# Patient Record
Sex: MALE | Race: WHITE | NOT HISPANIC OR LATINO | Employment: OTHER | URBAN - METROPOLITAN AREA
[De-identification: names, ages, dates, MRNs, and addresses within clinical notes are randomized per-mention and may not be internally consistent; named-entity substitution may affect disease eponyms.]

---

## 2019-08-25 ENCOUNTER — APPOINTMENT (OUTPATIENT)
Dept: CT IMAGING | Facility: HOSPITAL | Age: 79
End: 2019-08-25

## 2019-08-25 ENCOUNTER — HOSPITAL ENCOUNTER (OUTPATIENT)
Facility: HOSPITAL | Age: 79
Setting detail: OBSERVATION
Discharge: NURSING, REHAB OR LONG-TERM CARE | End: 2019-08-29
Attending: EMERGENCY MEDICINE | Admitting: INTERNAL MEDICINE

## 2019-08-25 ENCOUNTER — APPOINTMENT (OUTPATIENT)
Dept: GENERAL RADIOLOGY | Facility: HOSPITAL | Age: 79
End: 2019-08-25

## 2019-08-25 DIAGNOSIS — R42 ORTHOSTATIC DIZZINESS: ICD-10-CM

## 2019-08-25 DIAGNOSIS — R53.1 GENERALIZED WEAKNESS: Primary | ICD-10-CM

## 2019-08-25 DIAGNOSIS — R55 NEAR SYNCOPE: ICD-10-CM

## 2019-08-25 PROBLEM — G40.909 SEIZURE DISORDER (HCC): Status: ACTIVE | Noted: 2019-08-25

## 2019-08-25 PROBLEM — I25.10 CAD (CORONARY ARTERY DISEASE): Status: ACTIVE | Noted: 2019-08-25

## 2019-08-25 PROBLEM — J44.9 COPD (CHRONIC OBSTRUCTIVE PULMONARY DISEASE) (HCC): Status: ACTIVE | Noted: 2019-08-25

## 2019-08-25 PROBLEM — Z72.0 TOBACCO USE: Status: ACTIVE | Noted: 2019-08-25

## 2019-08-25 PROBLEM — I95.1 ORTHOSTATIC HYPOTENSION: Status: ACTIVE | Noted: 2019-08-25

## 2019-08-25 PROBLEM — F32.A DEPRESSION: Status: ACTIVE | Noted: 2019-08-25

## 2019-08-25 LAB
ALBUMIN SERPL-MCNC: 4.5 G/DL (ref 3.5–5.2)
ALBUMIN/GLOB SERPL: 2.8 G/DL
ALP SERPL-CCNC: 45 U/L (ref 39–117)
ALT SERPL W P-5'-P-CCNC: 13 U/L (ref 1–41)
ANION GAP SERPL CALCULATED.3IONS-SCNC: 10.4 MMOL/L (ref 5–15)
AST SERPL-CCNC: 23 U/L (ref 1–40)
BACTERIA UR QL AUTO: ABNORMAL /HPF
BILIRUB SERPL-MCNC: 1 MG/DL (ref 0.2–1.2)
BILIRUB UR QL STRIP: NEGATIVE
BUN BLD-MCNC: 18 MG/DL (ref 8–23)
BUN/CREAT SERPL: 20 (ref 7–25)
CALCIUM SPEC-SCNC: 9.9 MG/DL (ref 8.6–10.5)
CHLORIDE SERPL-SCNC: 104 MMOL/L (ref 98–107)
CLARITY UR: ABNORMAL
CO2 SERPL-SCNC: 22.6 MMOL/L (ref 22–29)
COD CRY URNS QL: ABNORMAL /HPF
COLOR UR: ABNORMAL
CREAT BLD-MCNC: 0.9 MG/DL (ref 0.76–1.27)
DEPRECATED RDW RBC AUTO: 77.1 FL (ref 37–54)
EOSINOPHIL # BLD MANUAL: 0.07 10*3/MM3 (ref 0–0.4)
EOSINOPHIL NFR BLD MANUAL: 1 % (ref 0.3–6.2)
ERYTHROCYTE [DISTWIDTH] IN BLOOD BY AUTOMATED COUNT: 18.5 % (ref 12.3–15.4)
GFR SERPL CREATININE-BSD FRML MDRD: 81 ML/MIN/1.73
GFR SERPL CREATININE-BSD FRML MDRD: 98 ML/MIN/1.73
GLOBULIN UR ELPH-MCNC: 1.6 GM/DL
GLUCOSE BLD-MCNC: 108 MG/DL (ref 65–99)
GLUCOSE UR STRIP-MCNC: NEGATIVE MG/DL
HCT VFR BLD AUTO: 35.2 % (ref 37.5–51)
HGB BLD-MCNC: 11.5 G/DL (ref 13–17.7)
HGB UR QL STRIP.AUTO: NEGATIVE
HOLD SPECIMEN: NORMAL
HOLD SPECIMEN: NORMAL
HYALINE CASTS UR QL AUTO: ABNORMAL /LPF
KETONES UR QL STRIP: ABNORMAL
LEUKOCYTE ESTERASE UR QL STRIP.AUTO: ABNORMAL
LYMPHOCYTES # BLD MANUAL: 2.36 10*3/MM3 (ref 0.7–3.1)
LYMPHOCYTES NFR BLD MANUAL: 36 % (ref 19.6–45.3)
LYMPHOCYTES NFR BLD MANUAL: 9 % (ref 5–12)
MCH RBC QN AUTO: 36.9 PG (ref 26.6–33)
MCHC RBC AUTO-ENTMCNC: 32.7 G/DL (ref 31.5–35.7)
MCV RBC AUTO: 112.8 FL (ref 79–97)
MONOCYTES # BLD AUTO: 0.59 10*3/MM3 (ref 0.1–0.9)
NEUTROPHILS # BLD AUTO: 3.61 10*3/MM3 (ref 1.7–7)
NEUTROPHILS NFR BLD MANUAL: 55 % (ref 42.7–76)
NITRITE UR QL STRIP: NEGATIVE
PH UR STRIP.AUTO: 5.5 [PH] (ref 5–8)
PLAT MORPH BLD: NORMAL
PLATELET # BLD AUTO: 304 10*3/MM3 (ref 140–450)
PMV BLD AUTO: 11.9 FL (ref 6–12)
POTASSIUM BLD-SCNC: 4.3 MMOL/L (ref 3.5–5.2)
PROCALCITONIN SERPL-MCNC: 0.03 NG/ML (ref 0.1–0.25)
PROT SERPL-MCNC: 6.1 G/DL (ref 6–8.5)
PROT UR QL STRIP: ABNORMAL
RBC # BLD AUTO: 3.12 10*6/MM3 (ref 4.14–5.8)
RBC # UR: ABNORMAL /HPF
RBC MORPH BLD: NORMAL
REF LAB TEST METHOD: ABNORMAL
SODIUM BLD-SCNC: 137 MMOL/L (ref 136–145)
SP GR UR STRIP: 1.02 (ref 1–1.03)
SQUAMOUS #/AREA URNS HPF: ABNORMAL /HPF
TROPONIN T SERPL-MCNC: <0.01 NG/ML (ref 0–0.03)
TROPONIN T SERPL-MCNC: <0.01 NG/ML (ref 0–0.03)
UROBILINOGEN UR QL STRIP: ABNORMAL
WBC MORPH BLD: NORMAL
WBC NRBC COR # BLD: 6.56 10*3/MM3 (ref 3.4–10.8)
WBC UR QL AUTO: ABNORMAL /HPF
WHOLE BLOOD HOLD SPECIMEN: NORMAL
WHOLE BLOOD HOLD SPECIMEN: NORMAL

## 2019-08-25 PROCEDURE — 70450 CT HEAD/BRAIN W/O DYE: CPT

## 2019-08-25 PROCEDURE — 93005 ELECTROCARDIOGRAM TRACING: CPT

## 2019-08-25 PROCEDURE — 85007 BL SMEAR W/DIFF WBC COUNT: CPT

## 2019-08-25 PROCEDURE — 93010 ELECTROCARDIOGRAM REPORT: CPT | Performed by: INTERNAL MEDICINE

## 2019-08-25 PROCEDURE — 99285 EMERGENCY DEPT VISIT HI MDM: CPT

## 2019-08-25 PROCEDURE — 84484 ASSAY OF TROPONIN QUANT: CPT

## 2019-08-25 PROCEDURE — G0378 HOSPITAL OBSERVATION PER HR: HCPCS

## 2019-08-25 PROCEDURE — 93005 ELECTROCARDIOGRAM TRACING: CPT | Performed by: EMERGENCY MEDICINE

## 2019-08-25 PROCEDURE — 84145 PROCALCITONIN (PCT): CPT | Performed by: EMERGENCY MEDICINE

## 2019-08-25 PROCEDURE — 85025 COMPLETE CBC W/AUTO DIFF WBC: CPT

## 2019-08-25 PROCEDURE — 71045 X-RAY EXAM CHEST 1 VIEW: CPT

## 2019-08-25 PROCEDURE — 80053 COMPREHEN METABOLIC PANEL: CPT

## 2019-08-25 PROCEDURE — 81001 URINALYSIS AUTO W/SCOPE: CPT | Performed by: NURSE PRACTITIONER

## 2019-08-25 PROCEDURE — 84484 ASSAY OF TROPONIN QUANT: CPT | Performed by: EMERGENCY MEDICINE

## 2019-08-25 PROCEDURE — 96360 HYDRATION IV INFUSION INIT: CPT

## 2019-08-25 PROCEDURE — 71250 CT THORAX DX C-: CPT

## 2019-08-25 RX ORDER — MELATONIN
1000 DAILY
COMMUNITY

## 2019-08-25 RX ORDER — DOCUSATE SODIUM 100 MG/1
100 CAPSULE, LIQUID FILLED ORAL 2 TIMES DAILY
COMMUNITY

## 2019-08-25 RX ORDER — MIDODRINE HYDROCHLORIDE 5 MG/1
5 TABLET ORAL 3 TIMES DAILY
Status: ON HOLD | COMMUNITY
End: 2019-08-28 | Stop reason: SDUPTHER

## 2019-08-25 RX ORDER — BUSPIRONE HYDROCHLORIDE 10 MG/1
10 TABLET ORAL 3 TIMES DAILY
COMMUNITY

## 2019-08-25 RX ORDER — ASPIRIN 81 MG/1
324 TABLET, CHEWABLE ORAL ONCE
Status: COMPLETED | OUTPATIENT
Start: 2019-08-25 | End: 2019-08-25

## 2019-08-25 RX ORDER — GABAPENTIN 300 MG/1
300 CAPSULE ORAL 3 TIMES DAILY
Status: ON HOLD | COMMUNITY
End: 2019-08-28 | Stop reason: SDUPTHER

## 2019-08-25 RX ORDER — ATORVASTATIN CALCIUM 80 MG/1
80 TABLET, FILM COATED ORAL DAILY
COMMUNITY

## 2019-08-25 RX ORDER — ASPIRIN 81 MG/1
81 TABLET, CHEWABLE ORAL DAILY
COMMUNITY

## 2019-08-25 RX ORDER — ALPRAZOLAM 0.5 MG/1
0.5 TABLET ORAL 2 TIMES DAILY PRN
Status: ON HOLD | COMMUNITY
End: 2019-08-28 | Stop reason: SDUPTHER

## 2019-08-25 RX ORDER — ISOSORBIDE MONONITRATE 30 MG/1
30 TABLET, EXTENDED RELEASE ORAL DAILY
COMMUNITY
End: 2019-08-29 | Stop reason: HOSPADM

## 2019-08-25 RX ORDER — CLOPIDOGREL BISULFATE 75 MG/1
75 TABLET ORAL DAILY
COMMUNITY

## 2019-08-25 RX ORDER — LEVETIRACETAM 500 MG/1
500 TABLET ORAL 2 TIMES DAILY
COMMUNITY

## 2019-08-25 RX ORDER — QUETIAPINE FUMARATE 50 MG/1
50 TABLET, FILM COATED ORAL NIGHTLY
COMMUNITY

## 2019-08-25 RX ORDER — SODIUM CHLORIDE 0.9 % (FLUSH) 0.9 %
10 SYRINGE (ML) INJECTION AS NEEDED
Status: DISCONTINUED | OUTPATIENT
Start: 2019-08-25 | End: 2019-08-29 | Stop reason: HOSPADM

## 2019-08-25 RX ORDER — PANTOPRAZOLE SODIUM 40 MG/1
40 TABLET, DELAYED RELEASE ORAL DAILY
COMMUNITY

## 2019-08-25 RX ORDER — SODIUM CHLORIDE 9 MG/ML
125 INJECTION, SOLUTION INTRAVENOUS CONTINUOUS
Status: DISCONTINUED | OUTPATIENT
Start: 2019-08-25 | End: 2019-08-27

## 2019-08-25 RX ORDER — SERTRALINE HYDROCHLORIDE 100 MG/1
100 TABLET, FILM COATED ORAL DAILY
COMMUNITY

## 2019-08-25 RX ORDER — IPRATROPIUM BROMIDE AND ALBUTEROL SULFATE 2.5; .5 MG/3ML; MG/3ML
3 SOLUTION RESPIRATORY (INHALATION) EVERY 4 HOURS PRN
COMMUNITY

## 2019-08-25 RX ORDER — RANOLAZINE 500 MG/1
500 TABLET, EXTENDED RELEASE ORAL 2 TIMES DAILY
COMMUNITY

## 2019-08-25 RX ADMIN — ASPIRIN 324 MG: 81 TABLET, CHEWABLE ORAL at 23:38

## 2019-08-25 RX ADMIN — SODIUM CHLORIDE 500 ML: 9 INJECTION, SOLUTION INTRAVENOUS at 23:17

## 2019-08-25 RX ADMIN — SODIUM CHLORIDE 1000 ML: 9 INJECTION, SOLUTION INTRAVENOUS at 17:05

## 2019-08-26 PROBLEM — I95.1 ORTHOSTATIC SYNCOPE: Status: ACTIVE | Noted: 2019-08-26

## 2019-08-26 LAB
ANION GAP SERPL CALCULATED.3IONS-SCNC: 8.9 MMOL/L (ref 5–15)
B PARAPERT DNA SPEC QL NAA+PROBE: NOT DETECTED
B PERT DNA SPEC QL NAA+PROBE: NOT DETECTED
BUN BLD-MCNC: 22 MG/DL (ref 8–23)
BUN/CREAT SERPL: 28.6 (ref 7–25)
C PNEUM DNA NPH QL NAA+NON-PROBE: NOT DETECTED
CALCIUM SPEC-SCNC: 8.4 MG/DL (ref 8.6–10.5)
CHLORIDE SERPL-SCNC: 109 MMOL/L (ref 98–107)
CO2 SERPL-SCNC: 22.1 MMOL/L (ref 22–29)
CORTIS SERPL-MCNC: 6.09 MCG/DL
CREAT BLD-MCNC: 0.77 MG/DL (ref 0.76–1.27)
DEPRECATED RDW RBC AUTO: 72.4 FL (ref 37–54)
ERYTHROCYTE [DISTWIDTH] IN BLOOD BY AUTOMATED COUNT: 17.6 % (ref 12.3–15.4)
FLUAV H1 2009 PAND RNA NPH QL NAA+PROBE: NOT DETECTED
FLUAV H1 HA GENE NPH QL NAA+PROBE: NOT DETECTED
FLUAV H3 RNA NPH QL NAA+PROBE: NOT DETECTED
FLUAV SUBTYP SPEC NAA+PROBE: NOT DETECTED
FLUBV RNA ISLT QL NAA+PROBE: NOT DETECTED
GFR SERPL CREATININE-BSD FRML MDRD: 97 ML/MIN/1.73
GLUCOSE BLD-MCNC: 82 MG/DL (ref 65–99)
HADV DNA SPEC NAA+PROBE: NOT DETECTED
HCOV 229E RNA SPEC QL NAA+PROBE: NOT DETECTED
HCOV HKU1 RNA SPEC QL NAA+PROBE: NOT DETECTED
HCOV NL63 RNA SPEC QL NAA+PROBE: NOT DETECTED
HCOV OC43 RNA SPEC QL NAA+PROBE: NOT DETECTED
HCT VFR BLD AUTO: 29.3 % (ref 37.5–51)
HGB BLD-MCNC: 9.6 G/DL (ref 13–17.7)
HMPV RNA NPH QL NAA+NON-PROBE: NOT DETECTED
HPIV1 RNA SPEC QL NAA+PROBE: NOT DETECTED
HPIV2 RNA SPEC QL NAA+PROBE: NOT DETECTED
HPIV3 RNA NPH QL NAA+PROBE: NOT DETECTED
HPIV4 P GENE NPH QL NAA+PROBE: NOT DETECTED
M PNEUMO IGG SER IA-ACNC: NOT DETECTED
MCH RBC QN AUTO: 36.4 PG (ref 26.6–33)
MCHC RBC AUTO-ENTMCNC: 32.8 G/DL (ref 31.5–35.7)
MCV RBC AUTO: 111 FL (ref 79–97)
PLATELET # BLD AUTO: 265 10*3/MM3 (ref 140–450)
PMV BLD AUTO: 11.3 FL (ref 6–12)
POTASSIUM BLD-SCNC: 3.6 MMOL/L (ref 3.5–5.2)
RBC # BLD AUTO: 2.64 10*6/MM3 (ref 4.14–5.8)
RHINOVIRUS RNA SPEC NAA+PROBE: NOT DETECTED
RSV RNA NPH QL NAA+NON-PROBE: NOT DETECTED
SODIUM BLD-SCNC: 140 MMOL/L (ref 136–145)
TROPONIN T SERPL-MCNC: <0.01 NG/ML (ref 0–0.03)
TROPONIN T SERPL-MCNC: <0.01 NG/ML (ref 0–0.03)
WBC NRBC COR # BLD: 5.34 10*3/MM3 (ref 3.4–10.8)

## 2019-08-26 PROCEDURE — 80048 BASIC METABOLIC PNL TOTAL CA: CPT | Performed by: NURSE PRACTITIONER

## 2019-08-26 PROCEDURE — 97535 SELF CARE MNGMENT TRAINING: CPT

## 2019-08-26 PROCEDURE — 96361 HYDRATE IV INFUSION ADD-ON: CPT

## 2019-08-26 PROCEDURE — 36415 COLL VENOUS BLD VENIPUNCTURE: CPT | Performed by: NURSE PRACTITIONER

## 2019-08-26 PROCEDURE — 0100U HC BIOFIRE FILMARRAY RESP PANEL 2: CPT | Performed by: NURSE PRACTITIONER

## 2019-08-26 PROCEDURE — 97161 PT EVAL LOW COMPLEX 20 MIN: CPT

## 2019-08-26 PROCEDURE — G0378 HOSPITAL OBSERVATION PER HR: HCPCS

## 2019-08-26 PROCEDURE — 97165 OT EVAL LOW COMPLEX 30 MIN: CPT

## 2019-08-26 PROCEDURE — 85027 COMPLETE CBC AUTOMATED: CPT | Performed by: NURSE PRACTITIONER

## 2019-08-26 PROCEDURE — 84484 ASSAY OF TROPONIN QUANT: CPT | Performed by: NURSE PRACTITIONER

## 2019-08-26 PROCEDURE — 82533 TOTAL CORTISOL: CPT | Performed by: NURSE PRACTITIONER

## 2019-08-26 PROCEDURE — 97110 THERAPEUTIC EXERCISES: CPT

## 2019-08-26 RX ORDER — ACETAMINOPHEN 325 MG/1
650 TABLET ORAL EVERY 4 HOURS PRN
Status: DISCONTINUED | OUTPATIENT
Start: 2019-08-26 | End: 2019-08-29 | Stop reason: HOSPADM

## 2019-08-26 RX ORDER — SERTRALINE HYDROCHLORIDE 100 MG/1
100 TABLET, FILM COATED ORAL DAILY
Status: DISCONTINUED | OUTPATIENT
Start: 2019-08-26 | End: 2019-08-29 | Stop reason: HOSPADM

## 2019-08-26 RX ORDER — RANOLAZINE 500 MG/1
500 TABLET, EXTENDED RELEASE ORAL EVERY 12 HOURS SCHEDULED
Status: DISCONTINUED | OUTPATIENT
Start: 2019-08-26 | End: 2019-08-29 | Stop reason: HOSPADM

## 2019-08-26 RX ORDER — DOXYCYCLINE HYCLATE 100 MG/1
100 CAPSULE ORAL 2 TIMES DAILY
COMMUNITY

## 2019-08-26 RX ORDER — LEVETIRACETAM 500 MG/1
500 TABLET ORAL EVERY 12 HOURS SCHEDULED
Status: DISCONTINUED | OUTPATIENT
Start: 2019-08-26 | End: 2019-08-29 | Stop reason: HOSPADM

## 2019-08-26 RX ORDER — PANTOPRAZOLE SODIUM 40 MG/1
40 TABLET, DELAYED RELEASE ORAL DAILY
Status: DISCONTINUED | OUTPATIENT
Start: 2019-08-26 | End: 2019-08-29 | Stop reason: HOSPADM

## 2019-08-26 RX ORDER — ONDANSETRON 2 MG/ML
4 INJECTION INTRAMUSCULAR; INTRAVENOUS EVERY 6 HOURS PRN
Status: DISCONTINUED | OUTPATIENT
Start: 2019-08-26 | End: 2019-08-29 | Stop reason: HOSPADM

## 2019-08-26 RX ORDER — QUETIAPINE FUMARATE 50 MG/1
50 TABLET, FILM COATED ORAL NIGHTLY
Status: DISCONTINUED | OUTPATIENT
Start: 2019-08-26 | End: 2019-08-29 | Stop reason: HOSPADM

## 2019-08-26 RX ORDER — ACETAMINOPHEN 160 MG/5ML
650 SOLUTION ORAL EVERY 4 HOURS PRN
Status: DISCONTINUED | OUTPATIENT
Start: 2019-08-26 | End: 2019-08-29 | Stop reason: HOSPADM

## 2019-08-26 RX ORDER — CALCIUM CARBONATE 200(500)MG
2 TABLET,CHEWABLE ORAL 3 TIMES DAILY PRN
Status: DISCONTINUED | OUTPATIENT
Start: 2019-08-26 | End: 2019-08-29 | Stop reason: HOSPADM

## 2019-08-26 RX ORDER — SODIUM CHLORIDE 0.9 % (FLUSH) 0.9 %
10 SYRINGE (ML) INJECTION EVERY 12 HOURS SCHEDULED
Status: DISCONTINUED | OUTPATIENT
Start: 2019-08-26 | End: 2019-08-29 | Stop reason: HOSPADM

## 2019-08-26 RX ORDER — ATORVASTATIN CALCIUM 80 MG/1
80 TABLET, FILM COATED ORAL DAILY
Status: DISCONTINUED | OUTPATIENT
Start: 2019-08-26 | End: 2019-08-26

## 2019-08-26 RX ORDER — IPRATROPIUM BROMIDE AND ALBUTEROL SULFATE 2.5; .5 MG/3ML; MG/3ML
3 SOLUTION RESPIRATORY (INHALATION) EVERY 4 HOURS PRN
Status: DISCONTINUED | OUTPATIENT
Start: 2019-08-26 | End: 2019-08-29 | Stop reason: HOSPADM

## 2019-08-26 RX ORDER — BISACODYL 5 MG/1
5 TABLET, DELAYED RELEASE ORAL DAILY PRN
Status: DISCONTINUED | OUTPATIENT
Start: 2019-08-26 | End: 2019-08-29 | Stop reason: HOSPADM

## 2019-08-26 RX ORDER — ACETAMINOPHEN 650 MG/1
650 SUPPOSITORY RECTAL EVERY 4 HOURS PRN
Status: DISCONTINUED | OUTPATIENT
Start: 2019-08-26 | End: 2019-08-29 | Stop reason: HOSPADM

## 2019-08-26 RX ORDER — CLOPIDOGREL BISULFATE 75 MG/1
75 TABLET ORAL DAILY
Status: DISCONTINUED | OUTPATIENT
Start: 2019-08-26 | End: 2019-08-29 | Stop reason: HOSPADM

## 2019-08-26 RX ORDER — SODIUM CHLORIDE 0.9 % (FLUSH) 0.9 %
3-10 SYRINGE (ML) INJECTION AS NEEDED
Status: DISCONTINUED | OUTPATIENT
Start: 2019-08-25 | End: 2019-08-29 | Stop reason: HOSPADM

## 2019-08-26 RX ORDER — MIDODRINE HYDROCHLORIDE 5 MG/1
5 TABLET ORAL 3 TIMES DAILY
Status: DISCONTINUED | OUTPATIENT
Start: 2019-08-26 | End: 2019-08-26

## 2019-08-26 RX ORDER — MIDODRINE HYDROCHLORIDE 5 MG/1
10 TABLET ORAL 3 TIMES DAILY
Status: DISCONTINUED | OUTPATIENT
Start: 2019-08-26 | End: 2019-08-29 | Stop reason: HOSPADM

## 2019-08-26 RX ORDER — ASPIRIN 81 MG/1
81 TABLET, CHEWABLE ORAL DAILY
Status: DISCONTINUED | OUTPATIENT
Start: 2019-08-26 | End: 2019-08-29 | Stop reason: HOSPADM

## 2019-08-26 RX ORDER — DOCUSATE SODIUM 100 MG/1
100 CAPSULE, LIQUID FILLED ORAL 2 TIMES DAILY
Status: DISCONTINUED | OUTPATIENT
Start: 2019-08-26 | End: 2019-08-29 | Stop reason: HOSPADM

## 2019-08-26 RX ORDER — BUSPIRONE HYDROCHLORIDE 10 MG/1
10 TABLET ORAL 3 TIMES DAILY
Status: DISCONTINUED | OUTPATIENT
Start: 2019-08-26 | End: 2019-08-29 | Stop reason: HOSPADM

## 2019-08-26 RX ORDER — SODIUM CHLORIDE 9 MG/ML
100 INJECTION, SOLUTION INTRAVENOUS CONTINUOUS
Status: DISCONTINUED | OUTPATIENT
Start: 2019-08-26 | End: 2019-08-26

## 2019-08-26 RX ORDER — ATORVASTATIN CALCIUM 80 MG/1
80 TABLET, FILM COATED ORAL NIGHTLY
Status: DISCONTINUED | OUTPATIENT
Start: 2019-08-26 | End: 2019-08-29 | Stop reason: HOSPADM

## 2019-08-26 RX ORDER — SODIUM CHLORIDE 0.9 % (FLUSH) 0.9 %
3 SYRINGE (ML) INJECTION EVERY 12 HOURS SCHEDULED
Status: DISCONTINUED | OUTPATIENT
Start: 2019-08-26 | End: 2019-08-26

## 2019-08-26 RX ADMIN — SODIUM CHLORIDE, PRESERVATIVE FREE 3 ML: 5 INJECTION INTRAVENOUS at 02:23

## 2019-08-26 RX ADMIN — LEVETIRACETAM 500 MG: 500 TABLET, FILM COATED ORAL at 02:58

## 2019-08-26 RX ADMIN — MIDODRINE HYDROCHLORIDE 10 MG: 5 TABLET ORAL at 16:35

## 2019-08-26 RX ADMIN — SODIUM CHLORIDE 125 ML/HR: 9 INJECTION, SOLUTION INTRAVENOUS at 00:22

## 2019-08-26 RX ADMIN — RANOLAZINE 500 MG: 500 TABLET, EXTENDED RELEASE ORAL at 20:45

## 2019-08-26 RX ADMIN — SODIUM CHLORIDE 125 ML/HR: 9 INJECTION, SOLUTION INTRAVENOUS at 02:23

## 2019-08-26 RX ADMIN — SODIUM CHLORIDE 125 ML/HR: 9 INJECTION, SOLUTION INTRAVENOUS at 08:30

## 2019-08-26 RX ADMIN — DOCUSATE SODIUM 100 MG: 100 CAPSULE, LIQUID FILLED ORAL at 08:31

## 2019-08-26 RX ADMIN — QUETIAPINE FUMARATE 50 MG: 50 TABLET, FILM COATED ORAL at 20:45

## 2019-08-26 RX ADMIN — RANOLAZINE 500 MG: 500 TABLET, EXTENDED RELEASE ORAL at 08:31

## 2019-08-26 RX ADMIN — SODIUM CHLORIDE, PRESERVATIVE FREE 10 ML: 5 INJECTION INTRAVENOUS at 08:30

## 2019-08-26 RX ADMIN — MIDODRINE HYDROCHLORIDE 10 MG: 5 TABLET ORAL at 20:46

## 2019-08-26 RX ADMIN — LEVETIRACETAM 500 MG: 500 TABLET, FILM COATED ORAL at 20:45

## 2019-08-26 RX ADMIN — BUSPIRONE HYDROCHLORIDE 10 MG: 10 TABLET ORAL at 16:35

## 2019-08-26 RX ADMIN — CLOPIDOGREL 75 MG: 75 TABLET, FILM COATED ORAL at 08:31

## 2019-08-26 RX ADMIN — PANTOPRAZOLE SODIUM 40 MG: 40 TABLET, DELAYED RELEASE ORAL at 08:30

## 2019-08-26 RX ADMIN — ASPIRIN 81 MG: 81 TABLET, CHEWABLE ORAL at 08:31

## 2019-08-26 RX ADMIN — BUSPIRONE HYDROCHLORIDE 10 MG: 10 TABLET ORAL at 20:46

## 2019-08-26 RX ADMIN — MIDODRINE HYDROCHLORIDE 5 MG: 5 TABLET ORAL at 08:31

## 2019-08-26 RX ADMIN — LEVETIRACETAM 500 MG: 500 TABLET, FILM COATED ORAL at 08:31

## 2019-08-26 RX ADMIN — ATORVASTATIN CALCIUM 80 MG: 80 TABLET, FILM COATED ORAL at 20:45

## 2019-08-26 RX ADMIN — SODIUM CHLORIDE, PRESERVATIVE FREE 10 ML: 5 INJECTION INTRAVENOUS at 20:46

## 2019-08-26 RX ADMIN — SODIUM CHLORIDE 125 ML/HR: 9 INJECTION, SOLUTION INTRAVENOUS at 16:35

## 2019-08-26 RX ADMIN — SERTRALINE 100 MG: 100 TABLET, FILM COATED ORAL at 08:31

## 2019-08-26 RX ADMIN — BUSPIRONE HYDROCHLORIDE 10 MG: 10 TABLET ORAL at 08:31

## 2019-08-27 ENCOUNTER — APPOINTMENT (OUTPATIENT)
Dept: CARDIOLOGY | Facility: HOSPITAL | Age: 79
End: 2019-08-27

## 2019-08-27 LAB
ANION GAP SERPL CALCULATED.3IONS-SCNC: 7.7 MMOL/L (ref 5–15)
AORTIC DIMENSIONLESS INDEX: 1 (DI)
BH CV ECHO MEAS - ACS: 2.2 CM
BH CV ECHO MEAS - AO MAX PG (FULL): 0.38 MMHG
BH CV ECHO MEAS - AO MAX PG: 5.8 MMHG
BH CV ECHO MEAS - AO MEAN PG (FULL): 1 MMHG
BH CV ECHO MEAS - AO MEAN PG: 3 MMHG
BH CV ECHO MEAS - AO ROOT AREA (BSA CORRECTED): 2.2
BH CV ECHO MEAS - AO ROOT AREA: 15.2 CM^2
BH CV ECHO MEAS - AO ROOT DIAM: 4.4 CM
BH CV ECHO MEAS - AO V2 MAX: 120 CM/SEC
BH CV ECHO MEAS - AO V2 MEAN: 77.5 CM/SEC
BH CV ECHO MEAS - AO V2 VTI: 27.1 CM
BH CV ECHO MEAS - AVA(I,A): 4.4 CM^2
BH CV ECHO MEAS - AVA(I,D): 4.4 CM^2
BH CV ECHO MEAS - AVA(V,A): 4.4 CM^2
BH CV ECHO MEAS - AVA(V,D): 4.4 CM^2
BH CV ECHO MEAS - BSA(HAYCOCK): 2 M^2
BH CV ECHO MEAS - BSA: 2 M^2
BH CV ECHO MEAS - BZI_BMI: 23.4 KILOGRAMS/M^2
BH CV ECHO MEAS - BZI_METRIC_HEIGHT: 185.4 CM
BH CV ECHO MEAS - BZI_METRIC_WEIGHT: 80.3 KG
BH CV ECHO MEAS - EDV(CUBED): 50.7 ML
BH CV ECHO MEAS - EDV(MOD-SP2): 125 ML
BH CV ECHO MEAS - EDV(MOD-SP4): 107 ML
BH CV ECHO MEAS - EDV(TEICH): 58.1 ML
BH CV ECHO MEAS - EF(CUBED): 46.7 %
BH CV ECHO MEAS - EF(MOD-BP): 57 %
BH CV ECHO MEAS - EF(MOD-SP2): 54.4 %
BH CV ECHO MEAS - EF(MOD-SP4): 65.4 %
BH CV ECHO MEAS - EF(TEICH): 39.8 %
BH CV ECHO MEAS - ESV(CUBED): 27 ML
BH CV ECHO MEAS - ESV(MOD-SP2): 57 ML
BH CV ECHO MEAS - ESV(MOD-SP4): 37 ML
BH CV ECHO MEAS - ESV(TEICH): 35 ML
BH CV ECHO MEAS - FS: 18.9 %
BH CV ECHO MEAS - IVS/LVPW: 1.4
BH CV ECHO MEAS - IVSD: 1.1 CM
BH CV ECHO MEAS - LAT PEAK E' VEL: 11 CM/SEC
BH CV ECHO MEAS - LV DIASTOLIC VOL/BSA (35-75): 52.4 ML/M^2
BH CV ECHO MEAS - LV MASS(C)D: 104.6 GRAMS
BH CV ECHO MEAS - LV MASS(C)DI: 51.2 GRAMS/M^2
BH CV ECHO MEAS - LV MAX PG: 5.4 MMHG
BH CV ECHO MEAS - LV MEAN PG: 2 MMHG
BH CV ECHO MEAS - LV SYSTOLIC VOL/BSA (12-30): 18.1 ML/M^2
BH CV ECHO MEAS - LV V1 MAX: 116 CM/SEC
BH CV ECHO MEAS - LV V1 MEAN: 68 CM/SEC
BH CV ECHO MEAS - LV V1 VTI: 26.2 CM
BH CV ECHO MEAS - LVIDD: 3.7 CM
BH CV ECHO MEAS - LVIDS: 3 CM
BH CV ECHO MEAS - LVLD AP2: 8.2 CM
BH CV ECHO MEAS - LVLD AP4: 8 CM
BH CV ECHO MEAS - LVLS AP2: 7.4 CM
BH CV ECHO MEAS - LVLS AP4: 6.2 CM
BH CV ECHO MEAS - LVOT AREA (M): 4.5 CM^2
BH CV ECHO MEAS - LVOT AREA: 4.5 CM^2
BH CV ECHO MEAS - LVOT DIAM: 2.4 CM
BH CV ECHO MEAS - LVPWD: 0.8 CM
BH CV ECHO MEAS - MED PEAK E' VEL: 9 CM/SEC
BH CV ECHO MEAS - MR MAX PG: 51 MMHG
BH CV ECHO MEAS - MR MAX VEL: 357 CM/SEC
BH CV ECHO MEAS - MV A DUR: 0.16 SEC
BH CV ECHO MEAS - MV A MAX VEL: 72.3 CM/SEC
BH CV ECHO MEAS - MV DEC SLOPE: 352 CM/SEC^2
BH CV ECHO MEAS - MV DEC TIME: 180 SEC
BH CV ECHO MEAS - MV E MAX VEL: 103 CM/SEC
BH CV ECHO MEAS - MV E/A: 1.4
BH CV ECHO MEAS - MV MAX PG: 3.2 MMHG
BH CV ECHO MEAS - MV MEAN PG: 1 MMHG
BH CV ECHO MEAS - MV P1/2T MAX VEL: 90.7 CM/SEC
BH CV ECHO MEAS - MV P1/2T: 75.5 MSEC
BH CV ECHO MEAS - MV V2 MAX: 89.1 CM/SEC
BH CV ECHO MEAS - MV V2 MEAN: 43.1 CM/SEC
BH CV ECHO MEAS - MV V2 VTI: 32.2 CM
BH CV ECHO MEAS - MVA P1/2T LCG: 2.4 CM^2
BH CV ECHO MEAS - MVA(P1/2T): 2.9 CM^2
BH CV ECHO MEAS - MVA(VTI): 3.7 CM^2
BH CV ECHO MEAS - PA ACC TIME: 0.11 SEC
BH CV ECHO MEAS - PA MAX PG (FULL): 0.98 MMHG
BH CV ECHO MEAS - PA MAX PG: 1.9 MMHG
BH CV ECHO MEAS - PA PR(ACCEL): 31.3 MMHG
BH CV ECHO MEAS - PA V2 MAX: 68.4 CM/SEC
BH CV ECHO MEAS - PULM A REVS DUR: 0.2 SEC
BH CV ECHO MEAS - PULM A REVS VEL: 36.5 CM/SEC
BH CV ECHO MEAS - PULM DIAS VEL: 38.4 CM/SEC
BH CV ECHO MEAS - PULM S/D: 1.2
BH CV ECHO MEAS - PULM SYS VEL: 45.3 CM/SEC
BH CV ECHO MEAS - PVA(V,A): 2.4 CM^2
BH CV ECHO MEAS - PVA(V,D): 2.4 CM^2
BH CV ECHO MEAS - QP/QS: 0.31
BH CV ECHO MEAS - RAP SYSTOLE: 3 MMHG
BH CV ECHO MEAS - RV MAX PG: 0.89 MMHG
BH CV ECHO MEAS - RV MEAN PG: 0 MMHG
BH CV ECHO MEAS - RV V1 MAX: 47.3 CM/SEC
BH CV ECHO MEAS - RV V1 MEAN: 32.5 CM/SEC
BH CV ECHO MEAS - RV V1 VTI: 10.7 CM
BH CV ECHO MEAS - RVOT AREA: 3.5 CM^2
BH CV ECHO MEAS - RVOT DIAM: 2.1 CM
BH CV ECHO MEAS - RVSP: 20.5 MMHG
BH CV ECHO MEAS - SI(AO): 201.7 ML/M^2
BH CV ECHO MEAS - SI(CUBED): 11.6 ML/M^2
BH CV ECHO MEAS - SI(LVOT): 58 ML/M^2
BH CV ECHO MEAS - SI(MOD-SP2): 33.3 ML/M^2
BH CV ECHO MEAS - SI(MOD-SP4): 34.3 ML/M^2
BH CV ECHO MEAS - SI(TEICH): 11.3 ML/M^2
BH CV ECHO MEAS - SV(AO): 412.1 ML
BH CV ECHO MEAS - SV(CUBED): 23.7 ML
BH CV ECHO MEAS - SV(LVOT): 118.5 ML
BH CV ECHO MEAS - SV(MOD-SP2): 68 ML
BH CV ECHO MEAS - SV(MOD-SP4): 70 ML
BH CV ECHO MEAS - SV(RVOT): 37.1 ML
BH CV ECHO MEAS - SV(TEICH): 23.1 ML
BH CV ECHO MEAS - TAPSE (>1.6): 2.3 CM
BH CV ECHO MEAS - TR MAX VEL: 209 CM/SEC
BH CV ECHO MEASUREMENTS AVERAGE E/E' RATIO: 10.3
BH CV VAS BP RIGHT ARM: NORMAL MMHG
BH CV XLRA - RV BASE: 3.9 CM
BH CV XLRA - TDI S': 12 CM/SEC
BUN BLD-MCNC: 12 MG/DL (ref 8–23)
BUN/CREAT SERPL: 18.2 (ref 7–25)
CALCIUM SPEC-SCNC: 8.7 MG/DL (ref 8.6–10.5)
CHLORIDE SERPL-SCNC: 115 MMOL/L (ref 98–107)
CO2 SERPL-SCNC: 22.3 MMOL/L (ref 22–29)
CREAT BLD-MCNC: 0.66 MG/DL (ref 0.76–1.27)
DEPRECATED RDW RBC AUTO: 74.4 FL (ref 37–54)
ERYTHROCYTE [DISTWIDTH] IN BLOOD BY AUTOMATED COUNT: 18.1 % (ref 12.3–15.4)
FOLATE SERPL-MCNC: 15.1 NG/ML (ref 4.78–24.2)
GFR SERPL CREATININE-BSD FRML MDRD: 116 ML/MIN/1.73
GLUCOSE BLD-MCNC: 85 MG/DL (ref 65–99)
HCT VFR BLD AUTO: 27.3 % (ref 37.5–51)
HGB BLD-MCNC: 8.7 G/DL (ref 13–17.7)
LEFT ATRIUM VOLUME INDEX: 38 ML/M2
LEFT ATRIUM VOLUME: 71 CM3
MAXIMAL PREDICTED HEART RATE: 139 BPM
MCH RBC QN AUTO: 35.7 PG (ref 26.6–33)
MCHC RBC AUTO-ENTMCNC: 31.9 G/DL (ref 31.5–35.7)
MCV RBC AUTO: 111.9 FL (ref 79–97)
PLATELET # BLD AUTO: 261 10*3/MM3 (ref 140–450)
PMV BLD AUTO: 11.3 FL (ref 6–12)
POTASSIUM BLD-SCNC: 3.7 MMOL/L (ref 3.5–5.2)
RBC # BLD AUTO: 2.44 10*6/MM3 (ref 4.14–5.8)
SODIUM BLD-SCNC: 145 MMOL/L (ref 136–145)
STRESS TARGET HR: 118 BPM
TSH SERPL DL<=0.05 MIU/L-ACNC: 3.83 MIU/ML (ref 0.27–4.2)
VIT B12 BLD-MCNC: 1032 PG/ML (ref 211–946)
WBC NRBC COR # BLD: 5.15 10*3/MM3 (ref 3.4–10.8)

## 2019-08-27 PROCEDURE — 97110 THERAPEUTIC EXERCISES: CPT | Performed by: PHYSICAL THERAPIST

## 2019-08-27 PROCEDURE — 93306 TTE W/DOPPLER COMPLETE: CPT | Performed by: INTERNAL MEDICINE

## 2019-08-27 PROCEDURE — 94640 AIRWAY INHALATION TREATMENT: CPT

## 2019-08-27 PROCEDURE — G0378 HOSPITAL OBSERVATION PER HR: HCPCS

## 2019-08-27 PROCEDURE — 94799 UNLISTED PULMONARY SVC/PX: CPT

## 2019-08-27 PROCEDURE — 82607 VITAMIN B-12: CPT | Performed by: INTERNAL MEDICINE

## 2019-08-27 PROCEDURE — 93306 TTE W/DOPPLER COMPLETE: CPT

## 2019-08-27 PROCEDURE — 85027 COMPLETE CBC AUTOMATED: CPT | Performed by: INTERNAL MEDICINE

## 2019-08-27 PROCEDURE — 84443 ASSAY THYROID STIM HORMONE: CPT | Performed by: INTERNAL MEDICINE

## 2019-08-27 PROCEDURE — 80048 BASIC METABOLIC PNL TOTAL CA: CPT | Performed by: INTERNAL MEDICINE

## 2019-08-27 PROCEDURE — 82746 ASSAY OF FOLIC ACID SERUM: CPT | Performed by: INTERNAL MEDICINE

## 2019-08-27 RX ORDER — ALPRAZOLAM 0.5 MG/1
0.5 TABLET ORAL 2 TIMES DAILY PRN
Status: DISCONTINUED | OUTPATIENT
Start: 2019-08-27 | End: 2019-08-29 | Stop reason: HOSPADM

## 2019-08-27 RX ORDER — GABAPENTIN 300 MG/1
300 CAPSULE ORAL 3 TIMES DAILY
Status: DISCONTINUED | OUTPATIENT
Start: 2019-08-27 | End: 2019-08-29 | Stop reason: HOSPADM

## 2019-08-27 RX ORDER — IPRATROPIUM BROMIDE AND ALBUTEROL SULFATE 2.5; .5 MG/3ML; MG/3ML
3 SOLUTION RESPIRATORY (INHALATION)
Status: DISCONTINUED | OUTPATIENT
Start: 2019-08-27 | End: 2019-08-29 | Stop reason: HOSPADM

## 2019-08-27 RX ADMIN — LEVETIRACETAM 500 MG: 500 TABLET, FILM COATED ORAL at 09:16

## 2019-08-27 RX ADMIN — MIDODRINE HYDROCHLORIDE 10 MG: 5 TABLET ORAL at 16:20

## 2019-08-27 RX ADMIN — MIDODRINE HYDROCHLORIDE 10 MG: 5 TABLET ORAL at 20:29

## 2019-08-27 RX ADMIN — PANTOPRAZOLE SODIUM 40 MG: 40 TABLET, DELAYED RELEASE ORAL at 09:15

## 2019-08-27 RX ADMIN — DOCUSATE SODIUM 100 MG: 100 CAPSULE, LIQUID FILLED ORAL at 09:15

## 2019-08-27 RX ADMIN — SODIUM CHLORIDE, PRESERVATIVE FREE 10 ML: 5 INJECTION INTRAVENOUS at 09:16

## 2019-08-27 RX ADMIN — SODIUM CHLORIDE, PRESERVATIVE FREE 10 ML: 5 INJECTION INTRAVENOUS at 20:37

## 2019-08-27 RX ADMIN — QUETIAPINE FUMARATE 50 MG: 50 TABLET, FILM COATED ORAL at 20:28

## 2019-08-27 RX ADMIN — GABAPENTIN 300 MG: 300 CAPSULE ORAL at 16:20

## 2019-08-27 RX ADMIN — LEVETIRACETAM 500 MG: 500 TABLET, FILM COATED ORAL at 20:29

## 2019-08-27 RX ADMIN — ATORVASTATIN CALCIUM 80 MG: 80 TABLET, FILM COATED ORAL at 20:29

## 2019-08-27 RX ADMIN — BUSPIRONE HYDROCHLORIDE 10 MG: 10 TABLET ORAL at 20:29

## 2019-08-27 RX ADMIN — RANOLAZINE 500 MG: 500 TABLET, EXTENDED RELEASE ORAL at 09:15

## 2019-08-27 RX ADMIN — DOCUSATE SODIUM 100 MG: 100 CAPSULE, LIQUID FILLED ORAL at 20:37

## 2019-08-27 RX ADMIN — GABAPENTIN 300 MG: 300 CAPSULE ORAL at 20:37

## 2019-08-27 RX ADMIN — IPRATROPIUM BROMIDE AND ALBUTEROL SULFATE 3 ML: 2.5; .5 SOLUTION RESPIRATORY (INHALATION) at 23:43

## 2019-08-27 RX ADMIN — SERTRALINE 100 MG: 100 TABLET, FILM COATED ORAL at 12:27

## 2019-08-27 RX ADMIN — RANOLAZINE 500 MG: 500 TABLET, EXTENDED RELEASE ORAL at 20:28

## 2019-08-27 RX ADMIN — ASPIRIN 81 MG: 81 TABLET, CHEWABLE ORAL at 09:16

## 2019-08-27 RX ADMIN — IPRATROPIUM BROMIDE AND ALBUTEROL SULFATE 3 ML: 2.5; .5 SOLUTION RESPIRATORY (INHALATION) at 11:09

## 2019-08-27 RX ADMIN — MIDODRINE HYDROCHLORIDE 10 MG: 5 TABLET ORAL at 09:16

## 2019-08-27 RX ADMIN — BUSPIRONE HYDROCHLORIDE 10 MG: 10 TABLET ORAL at 16:20

## 2019-08-27 RX ADMIN — BUSPIRONE HYDROCHLORIDE 10 MG: 10 TABLET ORAL at 09:16

## 2019-08-27 RX ADMIN — IPRATROPIUM BROMIDE AND ALBUTEROL SULFATE 3 ML: 2.5; .5 SOLUTION RESPIRATORY (INHALATION) at 14:45

## 2019-08-27 RX ADMIN — CLOPIDOGREL 75 MG: 75 TABLET, FILM COATED ORAL at 09:16

## 2019-08-28 LAB
ANION GAP SERPL CALCULATED.3IONS-SCNC: 7.9 MMOL/L (ref 5–15)
BASOPHILS # BLD AUTO: 0.06 10*3/MM3 (ref 0–0.2)
BASOPHILS NFR BLD AUTO: 1.3 % (ref 0–1.5)
BUN BLD-MCNC: 8 MG/DL (ref 8–23)
BUN/CREAT SERPL: 11.6 (ref 7–25)
CALCIUM SPEC-SCNC: 8.9 MG/DL (ref 8.6–10.5)
CHLORIDE SERPL-SCNC: 110 MMOL/L (ref 98–107)
CO2 SERPL-SCNC: 26.1 MMOL/L (ref 22–29)
CREAT BLD-MCNC: 0.69 MG/DL (ref 0.76–1.27)
DEPRECATED RDW RBC AUTO: 74 FL (ref 37–54)
EOSINOPHIL # BLD AUTO: 0.29 10*3/MM3 (ref 0–0.4)
EOSINOPHIL NFR BLD AUTO: 6 % (ref 0.3–6.2)
ERYTHROCYTE [DISTWIDTH] IN BLOOD BY AUTOMATED COUNT: 18 % (ref 12.3–15.4)
FERRITIN SERPL-MCNC: 372 NG/ML (ref 30–400)
GFR SERPL CREATININE-BSD FRML MDRD: 110 ML/MIN/1.73
GLUCOSE BLD-MCNC: 90 MG/DL (ref 65–99)
HCT VFR BLD AUTO: 26.7 % (ref 37.5–51)
HGB BLD-MCNC: 8.4 G/DL (ref 13–17.7)
IMM GRANULOCYTES # BLD AUTO: 0.03 10*3/MM3 (ref 0–0.05)
IMM GRANULOCYTES NFR BLD AUTO: 0.6 % (ref 0–0.5)
IRON 24H UR-MRATE: 37 MCG/DL (ref 59–158)
IRON SATN MFR SERPL: 16 % (ref 20–50)
LYMPHOCYTES # BLD AUTO: 1.8 10*3/MM3 (ref 0.7–3.1)
LYMPHOCYTES NFR BLD AUTO: 37.5 % (ref 19.6–45.3)
MCH RBC QN AUTO: 34.9 PG (ref 26.6–33)
MCHC RBC AUTO-ENTMCNC: 31.5 G/DL (ref 31.5–35.7)
MCV RBC AUTO: 110.8 FL (ref 79–97)
MONOCYTES # BLD AUTO: 0.47 10*3/MM3 (ref 0.1–0.9)
MONOCYTES NFR BLD AUTO: 9.8 % (ref 5–12)
NEUTROPHILS # BLD AUTO: 2.15 10*3/MM3 (ref 1.7–7)
NEUTROPHILS NFR BLD AUTO: 44.8 % (ref 42.7–76)
NRBC BLD AUTO-RTO: 0 /100 WBC (ref 0–0.2)
PLATELET # BLD AUTO: 272 10*3/MM3 (ref 140–450)
PMV BLD AUTO: 10.9 FL (ref 6–12)
POTASSIUM BLD-SCNC: 3.5 MMOL/L (ref 3.5–5.2)
RBC # BLD AUTO: 2.41 10*6/MM3 (ref 4.14–5.8)
SODIUM BLD-SCNC: 144 MMOL/L (ref 136–145)
TIBC SERPL-MCNC: 228 MCG/DL (ref 298–536)
TRANSFERRIN SERPL-MCNC: 153 MG/DL (ref 200–360)
WBC NRBC COR # BLD: 4.8 10*3/MM3 (ref 3.4–10.8)

## 2019-08-28 PROCEDURE — 83540 ASSAY OF IRON: CPT | Performed by: INTERNAL MEDICINE

## 2019-08-28 PROCEDURE — 80048 BASIC METABOLIC PNL TOTAL CA: CPT | Performed by: INTERNAL MEDICINE

## 2019-08-28 PROCEDURE — 82728 ASSAY OF FERRITIN: CPT | Performed by: INTERNAL MEDICINE

## 2019-08-28 PROCEDURE — G0378 HOSPITAL OBSERVATION PER HR: HCPCS

## 2019-08-28 PROCEDURE — 94799 UNLISTED PULMONARY SVC/PX: CPT

## 2019-08-28 PROCEDURE — 84466 ASSAY OF TRANSFERRIN: CPT | Performed by: INTERNAL MEDICINE

## 2019-08-28 PROCEDURE — 85025 COMPLETE CBC W/AUTO DIFF WBC: CPT | Performed by: INTERNAL MEDICINE

## 2019-08-28 RX ORDER — FERROUS SULFATE 325(65) MG
325 TABLET ORAL EVERY OTHER DAY
Status: DISCONTINUED | OUTPATIENT
Start: 2019-08-28 | End: 2019-08-29 | Stop reason: HOSPADM

## 2019-08-28 RX ORDER — GABAPENTIN 300 MG/1
300 CAPSULE ORAL 3 TIMES DAILY
Qty: 9 CAPSULE | Refills: 0 | Status: SHIPPED | OUTPATIENT
Start: 2019-08-28

## 2019-08-28 RX ORDER — MIDODRINE HYDROCHLORIDE 10 MG/1
10 TABLET ORAL 3 TIMES DAILY
Qty: 90 TABLET | Refills: 0 | Status: SHIPPED | OUTPATIENT
Start: 2019-08-28

## 2019-08-28 RX ORDER — FERROUS SULFATE 325(65) MG
325 TABLET ORAL EVERY OTHER DAY
Qty: 15 TABLET | Refills: 0 | Status: SHIPPED | OUTPATIENT
Start: 2019-08-30

## 2019-08-28 RX ORDER — ALPRAZOLAM 0.5 MG/1
0.5 TABLET ORAL 2 TIMES DAILY PRN
Qty: 6 TABLET | Refills: 0 | Status: SHIPPED | OUTPATIENT
Start: 2019-08-28

## 2019-08-28 RX ADMIN — SERTRALINE 100 MG: 100 TABLET, FILM COATED ORAL at 12:59

## 2019-08-28 RX ADMIN — GABAPENTIN 300 MG: 300 CAPSULE ORAL at 21:29

## 2019-08-28 RX ADMIN — FERROUS SULFATE TAB 325 MG (65 MG ELEMENTAL FE) 325 MG: 325 (65 FE) TAB at 12:59

## 2019-08-28 RX ADMIN — IPRATROPIUM BROMIDE AND ALBUTEROL SULFATE 3 ML: 2.5; .5 SOLUTION RESPIRATORY (INHALATION) at 23:26

## 2019-08-28 RX ADMIN — MIDODRINE HYDROCHLORIDE 10 MG: 5 TABLET ORAL at 21:29

## 2019-08-28 RX ADMIN — GABAPENTIN 300 MG: 300 CAPSULE ORAL at 16:07

## 2019-08-28 RX ADMIN — SODIUM CHLORIDE, PRESERVATIVE FREE 10 ML: 5 INJECTION INTRAVENOUS at 08:57

## 2019-08-28 RX ADMIN — RANOLAZINE 500 MG: 500 TABLET, EXTENDED RELEASE ORAL at 21:29

## 2019-08-28 RX ADMIN — QUETIAPINE FUMARATE 50 MG: 50 TABLET, FILM COATED ORAL at 21:29

## 2019-08-28 RX ADMIN — BUSPIRONE HYDROCHLORIDE 10 MG: 10 TABLET ORAL at 16:08

## 2019-08-28 RX ADMIN — ATORVASTATIN CALCIUM 80 MG: 80 TABLET, FILM COATED ORAL at 21:29

## 2019-08-28 RX ADMIN — BUSPIRONE HYDROCHLORIDE 10 MG: 10 TABLET ORAL at 08:56

## 2019-08-28 RX ADMIN — LEVETIRACETAM 500 MG: 500 TABLET, FILM COATED ORAL at 21:29

## 2019-08-28 RX ADMIN — IPRATROPIUM BROMIDE AND ALBUTEROL SULFATE 3 ML: 2.5; .5 SOLUTION RESPIRATORY (INHALATION) at 07:27

## 2019-08-28 RX ADMIN — LEVETIRACETAM 500 MG: 500 TABLET, FILM COATED ORAL at 08:56

## 2019-08-28 RX ADMIN — BUSPIRONE HYDROCHLORIDE 10 MG: 10 TABLET ORAL at 21:29

## 2019-08-28 RX ADMIN — ASPIRIN 81 MG: 81 TABLET, CHEWABLE ORAL at 08:56

## 2019-08-28 RX ADMIN — IPRATROPIUM BROMIDE AND ALBUTEROL SULFATE 3 ML: 2.5; .5 SOLUTION RESPIRATORY (INHALATION) at 15:16

## 2019-08-28 RX ADMIN — CLOPIDOGREL 75 MG: 75 TABLET, FILM COATED ORAL at 08:55

## 2019-08-28 RX ADMIN — DOCUSATE SODIUM 100 MG: 100 CAPSULE, LIQUID FILLED ORAL at 08:57

## 2019-08-28 RX ADMIN — MIDODRINE HYDROCHLORIDE 10 MG: 5 TABLET ORAL at 16:07

## 2019-08-28 RX ADMIN — MIDODRINE HYDROCHLORIDE 10 MG: 5 TABLET ORAL at 08:56

## 2019-08-28 RX ADMIN — GABAPENTIN 300 MG: 300 CAPSULE ORAL at 08:57

## 2019-08-28 RX ADMIN — PANTOPRAZOLE SODIUM 40 MG: 40 TABLET, DELAYED RELEASE ORAL at 08:56

## 2019-08-28 RX ADMIN — RANOLAZINE 500 MG: 500 TABLET, EXTENDED RELEASE ORAL at 08:55

## 2019-08-29 VITALS
OXYGEN SATURATION: 96 % | TEMPERATURE: 98.3 F | HEIGHT: 73 IN | SYSTOLIC BLOOD PRESSURE: 106 MMHG | WEIGHT: 181 LBS | DIASTOLIC BLOOD PRESSURE: 59 MMHG | RESPIRATION RATE: 16 BRPM | BODY MASS INDEX: 23.99 KG/M2 | HEART RATE: 68 BPM

## 2019-08-29 PROCEDURE — 94799 UNLISTED PULMONARY SVC/PX: CPT

## 2019-08-29 PROCEDURE — G0378 HOSPITAL OBSERVATION PER HR: HCPCS

## 2019-08-29 PROCEDURE — 97110 THERAPEUTIC EXERCISES: CPT

## 2019-08-29 RX ADMIN — ASPIRIN 81 MG: 81 TABLET, CHEWABLE ORAL at 08:41

## 2019-08-29 RX ADMIN — SODIUM CHLORIDE, PRESERVATIVE FREE 10 ML: 5 INJECTION INTRAVENOUS at 08:42

## 2019-08-29 RX ADMIN — LEVETIRACETAM 500 MG: 500 TABLET, FILM COATED ORAL at 08:41

## 2019-08-29 RX ADMIN — SERTRALINE 100 MG: 100 TABLET, FILM COATED ORAL at 12:19

## 2019-08-29 RX ADMIN — GABAPENTIN 300 MG: 300 CAPSULE ORAL at 08:40

## 2019-08-29 RX ADMIN — PANTOPRAZOLE SODIUM 40 MG: 40 TABLET, DELAYED RELEASE ORAL at 08:40

## 2019-08-29 RX ADMIN — CLOPIDOGREL 75 MG: 75 TABLET, FILM COATED ORAL at 08:41

## 2019-08-29 RX ADMIN — DOCUSATE SODIUM 100 MG: 100 CAPSULE, LIQUID FILLED ORAL at 08:41

## 2019-08-29 RX ADMIN — RANOLAZINE 500 MG: 500 TABLET, EXTENDED RELEASE ORAL at 08:41

## 2019-08-29 RX ADMIN — IPRATROPIUM BROMIDE AND ALBUTEROL SULFATE 3 ML: 2.5; .5 SOLUTION RESPIRATORY (INHALATION) at 07:54

## 2019-08-29 RX ADMIN — BUSPIRONE HYDROCHLORIDE 10 MG: 10 TABLET ORAL at 08:41

## 2019-08-29 RX ADMIN — MIDODRINE HYDROCHLORIDE 10 MG: 5 TABLET ORAL at 08:41

## 2020-07-31 ENCOUNTER — CONVERSION ENCOUNTER (OUTPATIENT)
Dept: NEUROLOGY | Facility: CLINIC | Age: 80
End: 2020-07-31

## 2020-07-31 ENCOUNTER — OFFICE VISIT CONVERTED (OUTPATIENT)
Dept: NEUROSURGERY | Facility: CLINIC | Age: 80
End: 2020-07-31
Attending: PHYSICIAN ASSISTANT

## 2020-08-13 ENCOUNTER — OFFICE VISIT CONVERTED (OUTPATIENT)
Dept: NEUROSURGERY | Facility: CLINIC | Age: 80
End: 2020-08-13
Attending: NEUROLOGICAL SURGERY

## 2021-04-05 ENCOUNTER — HOSPITAL ENCOUNTER (INPATIENT)
Dept: HOSPITAL 49 - FER | Age: 81
LOS: 4 days | Discharge: NURSING, REHAB OR LONG-TERM CARE | DRG: 871 | End: 2021-04-09
Admitting: INTERNAL MEDICINE
Payer: COMMERCIAL

## 2021-04-05 DIAGNOSIS — Z95.5: ICD-10-CM

## 2021-04-05 DIAGNOSIS — I10: ICD-10-CM

## 2021-04-05 DIAGNOSIS — F19.951: ICD-10-CM

## 2021-04-05 DIAGNOSIS — J20.9: ICD-10-CM

## 2021-04-05 DIAGNOSIS — J43.9: ICD-10-CM

## 2021-04-05 DIAGNOSIS — F41.1: ICD-10-CM

## 2021-04-05 DIAGNOSIS — D64.9: ICD-10-CM

## 2021-04-05 DIAGNOSIS — J96.02: ICD-10-CM

## 2021-04-05 DIAGNOSIS — Z95.0: ICD-10-CM

## 2021-04-05 DIAGNOSIS — F32.9: ICD-10-CM

## 2021-04-05 DIAGNOSIS — Z86.73: ICD-10-CM

## 2021-04-05 DIAGNOSIS — A41.9: Primary | ICD-10-CM

## 2021-04-05 DIAGNOSIS — Z95.1: ICD-10-CM

## 2021-04-05 DIAGNOSIS — T38.0X5A: ICD-10-CM

## 2021-04-05 DIAGNOSIS — I25.10: ICD-10-CM

## 2021-04-05 DIAGNOSIS — F10.11: ICD-10-CM

## 2021-04-05 DIAGNOSIS — G40.909: ICD-10-CM

## 2021-04-05 DIAGNOSIS — Z85.51: ICD-10-CM

## 2021-04-05 DIAGNOSIS — Z98.890: ICD-10-CM

## 2021-04-05 DIAGNOSIS — G92: ICD-10-CM

## 2021-04-05 DIAGNOSIS — Z99.81: ICD-10-CM

## 2021-04-05 DIAGNOSIS — F17.210: ICD-10-CM

## 2021-04-05 DIAGNOSIS — Z20.822: ICD-10-CM

## 2021-04-05 DIAGNOSIS — J96.21: ICD-10-CM

## 2021-04-05 LAB
ALBUMIN SERPL-MCNC: 4 G/DL (ref 3.4–5)
ALKALINE PHOSHATASE: 49 U/L (ref 46–116)
ALT SERPL-CCNC: 24 U/L (ref 16–63)
AMYLASE SERPL-CCNC: 73 U/L (ref 25–115)
AST: 18 U/L (ref 15–37)
BACTERIA: (no result)
BASOPHIL: 0.7 % (ref 0–2)
BILIRUBIN - TOTAL: 0.5 MG/DL (ref 0.2–1)
BILIRUBIN: NEGATIVE MG/DL
BLOOD: NEGATIVE ERY/UL
BUN SERPL-MCNC: 18 MG/DL (ref 7–18)
BUN/CREAT RATIO (CALC): 16.1 RATIO
CHLORIDE: 104 MMOL/L (ref 98–107)
CLARITY UR: CLEAR
CO2 (BICARBONATE): 29 MMOL/L (ref 21–32)
COLOR: YELLOW
CORONAVIRUS 2019 SARS-COV-2: NEGATIVE
CREATININE: 1.12 MG/DL (ref 0.67–1.17)
EOSINOPHIL: 5.4 % (ref 0–7)
GLOBULIN (CALCULATION): 2.8 G/DL
GLUCOSE (U): NORMAL MG/DL
GLUCOSE SERPL-MCNC: 123 MG/DL (ref 74–106)
HCT: 30.5 % (ref 42–52)
HGB BLD-MCNC: 10.4 G/DL (ref 13.2–18)
INFLUENZA A NAA: NEGATIVE
LACTIC ACID: 1.2 MMOL/L (ref 0.4–1.9)
LEUKOCYTES: (no result) LEU/UL
LYMPHOCYTE: 31.1 % (ref 15–48)
MCH RBC QN AUTO: 40.9 PG (ref 25–31)
MCHC RBC AUTO-ENTMCNC: 34.1 G/DL (ref 32–36)
MCV: 120.1 FL (ref 78–100)
MONOCYTE: 7.5 % (ref 0–12)
MPV: 10.8 FL (ref 6–9.5)
NEUTROPHIL: 55 % (ref 41–80)
NITRITE: NEGATIVE MG/DL
NRBC: 0
PLT: 298 K/UL (ref 150–400)
POTASSIUM: 3.4 MMOL/L (ref 3.5–5.1)
PRO-BNP: 306 PG/ML (ref ?–450)
PROTEIN: NEGATIVE MG/DL
RBC MORPHOLOGY: NORMAL
RBC: 2.54 M/UL (ref 4.7–6)
RDW: 18 % (ref 11.5–14)
SPECIFIC GRAVITY: 1.02 (ref 1–1.03)
SQUAMOUS EPITHELIAL CELLS: (no result)
TOTAL PROTEIN: 6.8 G/DL (ref 6.4–8.2)
URINARY WBC: (no result)
UROBILINOGEN: 0.2 MG/DL (ref 0.2–1)
WBC: 7.1 K/UL (ref 4–10.5)

## 2021-04-05 PROCEDURE — G0378 HOSPITAL OBSERVATION PER HR: HCPCS

## 2021-04-05 PROCEDURE — U0002 COVID-19 LAB TEST NON-CDC: HCPCS

## 2021-04-05 NOTE — NUR
PT. RESIDES ALONE IN AN APT. AT [place].  HE HAS A POA AND HEALTH CARE
SURROGATE. [facility] AND [facility].  HE HAS A ROLLATOR AND HOME O2.  HE
REQUESTS A/[facility]. AFFLIATIONS EXPLAINED.

## 2021-04-06 LAB
ALBUMIN SERPL-MCNC: 3.3 G/DL (ref 3.4–5)
ALKALINE PHOSHATASE: 41 U/L (ref 46–116)
ALT SERPL-CCNC: 24 U/L (ref 16–63)
AST: 18 U/L (ref 15–37)
BACTERIA: (no result)
BASOPHIL: 0.1 % (ref 0–2)
BASOPHIL: 0.1 % (ref 0–2)
BILIRUBIN - TOTAL: 0.5 MG/DL (ref 0.2–1)
BILIRUBIN: NEGATIVE MG/DL
BLOOD: NEGATIVE ERY/UL
BUN SERPL-MCNC: 32 MG/DL (ref 7–18)
BUN SERPL-MCNC: 34 MG/DL (ref 7–18)
BUN/CREAT RATIO (CALC): 39 RATIO
BUN/CREAT RATIO (CALC): 41 RATIO
CHLORIDE: 103 MMOL/L (ref 98–107)
CHLORIDE: 104 MMOL/L (ref 98–107)
CLARITY UR: CLEAR
CO2 (BICARBONATE): 22 MMOL/L (ref 21–32)
CO2 (BICARBONATE): 24 MMOL/L (ref 21–32)
COLOR: YELLOW
CREATININE: 0.82 MG/DL (ref 0.67–1.17)
CREATININE: 0.83 MG/DL (ref 0.67–1.17)
EOSINOPHIL: 0 % (ref 0–7)
EOSINOPHIL: 0 % (ref 0–7)
GLOBULIN (CALCULATION): 2.7 G/DL
GLUCOSE (U): NORMAL MG/DL
GLUCOSE SERPL-MCNC: 126 MG/DL (ref 74–106)
GLUCOSE SERPL-MCNC: 189 MG/DL (ref 74–106)
HCT: 26.7 % (ref 42–52)
HCT: 27.8 % (ref 42–52)
HGB BLD-MCNC: 9.3 G/DL (ref 13.2–18)
HGB BLD-MCNC: 9.7 G/DL (ref 13.2–18)
LEUKOCYTES: NEGATIVE LEU/UL
LYMPHOCYTE: 4 % (ref 15–48)
LYMPHOCYTE: 5.3 % (ref 15–48)
MAGNESIUM SERPL-MCNC: 1.9 MG/DL (ref 1.8–2.4)
MAGNESIUM SERPL-MCNC: 2.1 MG/DL (ref 1.8–2.4)
MCH RBC QN AUTO: 40.6 PG (ref 25–31)
MCH RBC QN AUTO: 40.6 PG (ref 25–31)
MCHC RBC AUTO-ENTMCNC: 34.8 G/DL (ref 32–36)
MCHC RBC AUTO-ENTMCNC: 34.9 G/DL (ref 32–36)
MCV: 116.3 FL (ref 78–100)
MCV: 116.6 FL (ref 78–100)
MONOCYTE: 2.1 % (ref 0–12)
MONOCYTE: 3.9 % (ref 0–12)
MPV: 11.1 FL (ref 6–9.5)
MPV: 11.1 FL (ref 6–9.5)
NEUTROPHIL: 90 % (ref 41–80)
NEUTROPHIL: 93.4 % (ref 41–80)
NITRITE: NEGATIVE MG/DL
NRBC: 0.1
NRBC: 0.2
PLT: 271 K/UL (ref 150–400)
PLT: 272 K/UL (ref 150–400)
POTASSIUM: 3.6 MMOL/L (ref 3.5–5.1)
POTASSIUM: 3.8 MMOL/L (ref 3.5–5.1)
PROTEIN: (no result) MG/DL
RBC MORPHOLOGY: NORMAL
RBC MORPHOLOGY: NORMAL
RBC: 2.29 M/UL (ref 4.7–6)
RBC: 2.39 M/UL (ref 4.7–6)
RDW: 18.3 % (ref 11.5–14)
RDW: 18.5 % (ref 11.5–14)
SPECIFIC GRAVITY: 1.02 (ref 1–1.03)
SQUAMOUS EPITHELIAL CELLS: (no result)
TOTAL PROTEIN: 6 G/DL (ref 6.4–8.2)
URINARY RBC: (no result)
URINARY WBC: (no result)
UROBILINOGEN: 4 MG/DL (ref 0.2–1)
WBC: 12.1 K/UL (ref 4–10.5)
WBC: 19.6 K/UL (ref 4–10.5)

## 2021-04-06 NOTE — NUR
BED ALARM SOUNDING. PATIENT NOTED STANDING BY CLOSET, IV RIPPED OUT AND BLOOD
DRIPPING ON FLOOR. PATIENT STATES "3 PEOPLE IN HIS CLOSET" AND THAT HE "IS
SEEING THINGS" AND "SICK OF THIS SHIT" NEW IV PLACED IN RIGHT WRIST #20
MD NOTIFIED OF HALLUCINATIONS/CONFUSION. BED ALARM IN USE

## 2021-04-06 NOTE — NUR
TC TO [facility] AND HEALTH CARE SURROGATE.  ADVISED OF THE IM
AS PT WAS MADE INPATIENT.  SHE ACKNOWLEDGED UNDERSTANDING.  ADVISED ILE[facility]
THAT PT. MAY REQUIRE SKILLED NURSING.  SHE CHOSE 1.  [facility], 2 [facility], 3.
[facility].

## 2021-04-07 LAB
BACTERIA: (no result)
BASOPHIL: 0.1 % (ref 0–2)
BILIRUBIN: NEGATIVE MG/DL
BLOOD: NEGATIVE ERY/UL
BUN SERPL-MCNC: 26 MG/DL (ref 7–18)
BUN/CREAT RATIO (CALC): 34.7 RATIO
CAOX CRY #/AREA URNS HPF: (no result) /[HPF]
CHLORIDE: 107 MMOL/L (ref 98–107)
CLARITY UR: CLEAR
CO2 (BICARBONATE): 25 MMOL/L (ref 21–32)
COLOR: YELLOW
CREATININE: 0.75 MG/DL (ref 0.67–1.17)
EOSINOPHIL: 0 % (ref 0–7)
GLUCOSE (U): NORMAL MG/DL
GLUCOSE SERPL-MCNC: 107 MG/DL (ref 74–106)
HCT: 27.1 % (ref 42–52)
HGB BLD-MCNC: 9.3 G/DL (ref 13.2–18)
LEUKOCYTES: NEGATIVE LEU/UL
LYMPHOCYTE: 11.9 % (ref 15–48)
MCH RBC QN AUTO: 40.1 PG (ref 25–31)
MCHC RBC AUTO-ENTMCNC: 34.3 G/DL (ref 32–36)
MCV: 116.8 FL (ref 78–100)
MONOCYTE: 6.9 % (ref 0–12)
MPV: 10.9 FL (ref 6–9.5)
NEUTROPHIL: 80.2 % (ref 41–80)
NITRITE: NEGATIVE MG/DL
NRBC: 0
PLT: 283 K/UL (ref 150–400)
POTASSIUM: 4 MMOL/L (ref 3.5–5.1)
PROTEIN: (no result) MG/DL
RBC MORPHOLOGY: NORMAL
RBC: 2.32 M/UL (ref 4.7–6)
RDW: 18.5 % (ref 11.5–14)
SPECIFIC GRAVITY: 1.02 (ref 1–1.03)
SQUAMOUS EPITHELIAL CELLS: (no result)
URINARY WBC: (no result)
UROBILINOGEN: 4 MG/DL (ref 0.2–1)
WBC: 10.1 K/UL (ref 4–10.5)

## 2021-04-08 LAB
FOLIC ACID (SERUM): 18.1 NG/ML (ref 8.6–58.9)
IRON % SATURATION: 24.3 %SAT (ref 20–50)
IRON SERPL-MCNC: 54 UG/DL (ref 65–175)
RETICULOCYTE COUNT: 0.6 % (ref 1–2)
VITAMIN B12: 1014 PG/ML (ref 193–986)

## 2021-04-08 NOTE — NUR
ADVISED PT. THAT A REFERRAL TO \[facility]\"" HAD BEEN MADE FOR REHAB.  ADVISED THAT
[facility] DID NOT HAVE A BED.  ADVSIED THAT WE ARE AWAITING INSURANCE APPROVAL.
 
TC TO [facility].  ADVISED THAT REFERRAL HAS BEEN SENT TO [facility] AS
[facility] DID NOT HAVE A BED.  SHE IS IN AGREEMENT.  ADVISED THAT WE ARE
AWAIING INS. APPROVAL.

## 2021-04-09 NOTE — NUR
ASKED DUYEN GARCIA, IF PT. WOULD REQUIRE O2 TO TRANSPORT TO [facility], BUT SHE
SAID THAT HE WOULD NOT REQUIRE O2 FOR TRANSPORTATION.  ADVISED JOSE TO
CONTACT [facility] FOR TRANSPORTATION TO [facility].  [phone].

## 2021-04-09 NOTE — NUR
ALEX FROM [facility] ADVISED THAT THEY HAVE RECEIVED AUTHORIZATION FOR PT/
ADMISSION.  ADVISED DUYEN GARCIA, OF APPROVAL.
 
REPORT NUMBER [id number]
FAX [phone]
 
TC TO [facility].  SHE ADVISED TO CALL HER , [facility], TO TRANSPORT PT
TO [facility].  PHONE NUMBER [phone].

## 2021-05-10 NOTE — H&P
History and Physical      Patient Name: Georgi Simpson   Patient ID: [id number]   Sex: Male   YOB: 1940    Primary Care Provider: Jose Alejandro Garcia MD   Referring Provider: Bo PERSAUD    Visit Date: July 31, 2020    Provider: Charo Tyler PA-C   Location: [facility]   Location Address: [address] [place] [address]  [id number]   Location Phone: [phone]          Chief Complaint  · Back pain      History Of Present Illness  The patient is a 80 year old /White male, who presents on referral from Bo PERSAUD, for a neurosurgical evaluation of low back pain, right leg pain, and.   The right leg pain involves the right lower leg in a nonspecific and distribution. The pain developed gradually several years ago but worse over the past few months It is severe (7-8/10) has an aching and a sharp quality and radiates into the right lower leg in a nonspecific distribution. The pain has been constant. The pain tends to be maximal at no specific time, but waxes and wanes in severity throughout the day. The patient states the pain is aggravated by prolonged standing, walking long distances, and staying in one position for extended periods. Improves some with sitting   He denies bowel or bladder dysfunction. The patient's past medical history is notable for prior lumbar spine surgery. He underwent a laminotomy at L2-3, L3-4, and L4-5 bilaterally approximately 3 years ago by Dr. Swenson.   RECENT INTERVENTIONS:  He has been previously treated with pain medication.   INFORMATION REVIEWED:  The following information was reviewed: radiology reports and images. MRI of the lumbar spine and from [facility] (not on PACS) revealed small right L4/5 disc protrusion with dextroscoliosis and prior L2/3 to L4/5 laminectomy with granulation tissue present. Multilevel degenerative changes. These were the most notable findings.      He has a history of CAD with CABG and Sean  EMERSON Yepez is his cardiologist group.  Has appt. with him the 6th of August.  On ASA and Plavix.       Past Medical History  Anxiety; CAD (coronary artery disease); COPD (chronic obstructive pulmonary disease); Depression, unspecified; Hyperlipidemia; Hypertension; Lumbago; Lumbago/low back pain         Past Surgical History  Bladder tumor excision; CABG; cardiac stents; Cataract surgery; Hand surgery; Hernia Repair; Lumbar laminectomy         Medication List  alprazolam 0.5 mg oral tablet; aspirin 81 mg oral tablet,delayed release (DR/EC); atorvastatin 40 mg oral tablet; buspirone 10 mg oral tablet; clopidogrel bisulfate (bulk) 100 % miscellaneous powder; gabapentin 300 mg oral capsule; levetiracetam 500 mg oral tablet extended release 24 hr; Lipitor 40 mg oral tablet; Neurontin 300 mg oral capsule; pantoprazole 40 mg oral tablet,delayed release (DR/EC); Plavix 75 mg oral tablet; Protonix 40 mg oral tablet,delayed release (DR/EC); Vitamin D3 2,000 unit oral capsule; Xanax 0.25 mg oral tablet; Zoloft 100 mg oral tablet         Allergy List  Omnicef       Allergies Reconciled  Family Medical History  Heart Attack (MI)         Social History  Tobacco (Current every day)         Review of Systems  · Constitutional  o Admits  o : fatigue  o Denies  o : chills, excessive sweating, fever, sycope/passing out, weight gain, weight loss  · Eyes  o Admits  o : changes in vision  o Denies  o : blurry vision, double vision  · HENT  o Admits  o : loss of hearing, seasonal allergies  o Denies  o : ringing in the ears, ear aches, sore throat, nasal congestion, sinus pain, nose bleeds  · Cardiovascular  o Admits  o : easy burising or bleeding  o Denies  o : blood clots, swollen legs, anemia, transfusions  · Respiratory  o Admits  o : shortness of breath, pneumonia, COPD  o Denies  o : dry cough, productive cough  · Gastrointestinal  o Denies  o : difficulty swallowing, reflux  · Genitourinary  o Admits  o : erectile  dysfunction  o Denies  o : incontinence  · Neurologic  o Admits  o : seizure, loss of balance, falls, dizziness/vertigo, numbness/tingling/paresthesia , difficulty with coordination, weakness  o Denies  o : headache, stroke, tremor, difficulty with sleep, difficulty with dexterity  · Musculoskeletal  o Admits  o : neck stiffness/pain, muscle aches, joint pain, weakness, pain radiating in leg, low back pain  o Denies  o : swollen lymph nodes, spasms, sciatica, pain radiating in arm  · Endocrine  o Denies  o : diabetes, thyroid disorder  · Psychiatric  o Admits  o : anxiety, depression  · All Others Negative      Vitals  Date Time BP Position Site L\R Cuff Size HR RR TEMP (F) WT  HT  BMI kg/m2 BSA m2 O2 Sat        07/31/2020 09:38 AM        97.9 162lbs 5oz 6'   22.01 1.93           Physical Examination  · Constitutional  o Appearance  o : well-nourished, well developed, alert, in no acute distress  · Respiratory  o Respiratory Effort  o : breathing unlabored  · Cardiovascular  o Peripheral Vascular System  o :   § Extremities  § : no edema or cyanosis  · Musculoskeletal  o Spine  o :   § Inspection/Palpation  § : tenderness to palpation present   o Right Lower Extremity  o :   § Inspection/Palpation  § : no joint or limb tenderness to palpation, no edema present, no ecchymosis  § Joint Stability  § : joint stability within normal limits  § Range of Motion  § : range of motion normal, no joint crepitations present, no pain on motion, Reji's test negative  o Left Lower Extremity  o :   § Inspection/Palpation  § : no joint or limb tenderness to palpation, no edema present, no ecchymosis  § Joint Stability  § : joint stability within normal limits  § Range of Motion  § : range of motion normal, no joint crepitations present, no pain on motion, Reji's test negative  · Skin and Subcutaneous Tissue  o Extremities  o :   § Right Lower Extremity  § : no lesions or areas of discoloration  § Left Lower Extremity  § : no  "lesions or areas of discoloration  o Back  o : no lesions or areas of discoloration  · Neurologic  o Mental Status Examination  o :   § Orientation  § : alert and oriented to time, person, place and events  o Motor Examination  o :   § RLE Strength  § : strength normal  § RLE Motor Function  § : tone normal, no atrophy, no abnormal movements noted  § LLE Strength  § : strength normal  § LLE Motor Function  § : tone normal, no atrophy, no abnormal movements noted  o Reflexes  o :   § RLE  § : knee and ankle reflexes 1/4, SLR negative  § LLE  § : knee and ankle reflexes 1/4, SLR negative  o Sensation  o :   § Light Touch  § : sensation intact to light touch in extremities except diminished in the right posterior and anterior lower leg  o Gait and Station  o :   § Gait Screening  § : slow to go from sitting to standing and using a rollator walker and gait is slow  · Psychiatric  o Mood and Affect  o : mood normal, affect appropriate          Assessment  · Degeneration of lumbar intervertebral disc     722.52/M51.36  · Facet arthropathy     721.90/M46.90  · Lumbago/low back pain     724.3/M54.40  · Lumbar disc herniation, L4-5     722.10/M51.26  · History of lumbar surgery     V15.29/Z98.890    Problems Reconciled  Plan  · Medications  o Medications have been Reconciled  o Transition of Care or Provider Policy  · Instructions  o Encouraged to follow-up with Primary Care Provider for preventative care.  o The ROS and the PFSH were reviewed at today's visit.  o Call or return to office if symptoms worsen or persist.  o I will have him f/u with Dr. Swenson to discuss a potential right L4/5 MID. I will work on cardiac clearance with Sean Yepez NP.   · Associate Tasks  o Task ID [id number] \"''Provider to Nurse: please fax note for cardiac clearance to Sean Yepez NP in [place]            Electronically Signed by: Charo Tyler PA-C -Author on July 31, 2020 09:59:54 AM  "

## 2021-05-13 NOTE — PROGRESS NOTES
Progress Note      Patient Name: Georgi Simpson   Patient ID: [id number]   Sex: Male   YOB: 1940    Primary Care Provider: Jose Alejandro Garcia MD   Referring Provider: Bo PERSAUD    Visit Date: August 13, 2020    Provider: Johnny Swenson MD   Location: [facility]   Location Address: [address] [place] [address]  [id number]   Location Phone: [phone]          Chief Complaint  · Follow-up     Here with complaints of low back and bilateral leg pain.       History Of Present Illness  The patient is a 80 year old /White male who is in the office for followup appointment. He has back and lower leg pain with numbness into both legs. The left is worse than the right. It is better with laying down. The lower back pain is worse than the leg pain. His MRI shows a notable improvement in the stenosis he had (previous 3 level surgery). He has severe DDD with scoliosis.       Past Medical History  Anxiety; CAD (coronary artery disease); COPD (chronic obstructive pulmonary disease); Degeneration of lumbar intervertebral disc; Depression, unspecified; Facet arthropathy; History of lumbar surgery; Hyperlipidemia; Hypertension; Lumbago; Lumbago/low back pain; Lumbar disc herniation, L4-5         Past Surgical History  Bladder tumor excision; CABG; cardiac stents; Cataract surgery; Hand surgery; Hernia Repair; Lumbar laminectomy         Medication List  albuterol sulfate 0.63 mg/3 mL inhalation solution for nebulization; alprazolam 0.5 mg oral tablet; aspirin 81 mg oral tablet,delayed release (DR/EC); atorvastatin 40 mg oral tablet; buspirone 10 mg oral tablet; clopidogrel bisulfate (bulk) 100 % miscellaneous powder; docusate sodium 100 mg oral capsule; gabapentin 300 mg oral capsule; guaifenesin 200 mg oral tablet; levetiracetam 500 mg oral tablet extended release 24 hr; Lipitor 40 mg oral tablet; Neurontin 300 mg oral capsule; pantoprazole 40 mg oral tablet,delayed release  (DR/EC); Plavix 75 mg oral tablet; Protonix 40 mg oral tablet,delayed release (DR/EC); Vitamin D3 2,000 unit oral capsule; Xanax 0.25 mg oral tablet; Zoloft 100 mg oral tablet         Allergy List  Omnicef       Allergies Reconciled  Family Medical History  Heart Attack (MI)         Social History  Tobacco (Current every day)         Review of Systems  · Constitutional  o Admits  o : fatigue, sycope/passing out, weight loss  o Denies  o : chills, excessive sweating, fever, weight gain  · Eyes  o Admits  o : blurry vision  o Denies  o : changes in vision, double vision  · HENT  o Denies  o : loss of hearing, ringing in the ears, ear aches, sore throat, nasal congestion, sinus pain, nose bleeds, seasonal allergies  · Cardiovascular  o Denies  o : blood clots, swollen legs, anemia, easy burising or bleeding, transfusions  · Respiratory  o Admits  o : shortness of breath  o Denies  o : dry cough, productive cough, pneumonia, COPD  · Gastrointestinal  o Denies  o : difficulty swallowing, reflux  · Genitourinary  o Admits  o : erectile dysfunction  o Denies  o : incontinence  · Neurologic  o Admits  o : seizure, loss of balance, falls, dizziness/vertigo, difficulty with sleep, numbness/tingling/paresthesia   o Denies  o : headache, stroke, tremor, difficulty with coordination, difficulty with dexterity, weakness  · Musculoskeletal  o Admits  o : neck stiffness/pain, muscle aches, joint pain, weakness, pain radiating in leg, low back pain  o Denies  o : swollen lymph nodes, spasms, sciatica, pain radiating in arm  · Endocrine  o Denies  o : diabetes, thyroid disorder  · Psychiatric  o Admits  o : anxiety, depression      Vitals  Date Time BP Position Site L\R Cuff Size HR RR TEMP (F) WT  HT  BMI kg/m2 BSA m2 O2 Sat        08/13/2020 10:14 AM        97.1 162lbs 0oz 6'   21.97 1.93               Assessment  · Degeneration of lumbar intervertebral disc     722.52/M51.36  · Facet arthropathy     721.90/M46.90  · Lumbago/low  back pain     724.3/M54.40  · Lumbar Spinal Stenosis     724.02/M48.06  Canal much more patent than 2016  · History of lumbar surgery     V15.29/Z98.890      Plan  · Medications  o tramadol 50 mg oral tablet   SIG: take 1 tablet by oral route 3 times a day as needed   DISP: (30) tablets with 0 refills  Prescribed on 08/13/2020     o Medications have been Reconciled  o Transition of Care or Provider Policy  · Instructions  o The ROS and the PFSH were reviewed at today's visit.  o Call or return to office if symptoms worsen or persist.             Electronically Signed by: Johnny Swenson MD -Author on August 13, 2020 10:34:00 AM

## 2021-05-15 VITALS — HEIGHT: 72 IN | TEMPERATURE: 97.9 F | WEIGHT: 162.31 LBS | BODY MASS INDEX: 21.98 KG/M2

## 2021-05-15 VITALS — WEIGHT: 162 LBS | BODY MASS INDEX: 21.94 KG/M2 | TEMPERATURE: 97.1 F | HEIGHT: 72 IN

## 2022-06-14 ENCOUNTER — OFFICE VISIT (OUTPATIENT)
Dept: VASCULAR SURGERY | Facility: HOSPITAL | Age: 82
End: 2022-06-14

## 2022-06-14 VITALS
DIASTOLIC BLOOD PRESSURE: 74 MMHG | RESPIRATION RATE: 16 BRPM | HEART RATE: 71 BPM | TEMPERATURE: 98.2 F | OXYGEN SATURATION: 92 % | SYSTOLIC BLOOD PRESSURE: 120 MMHG

## 2022-06-14 DIAGNOSIS — M79.604 PAIN OF RIGHT LOWER EXTREMITY: Primary | ICD-10-CM

## 2022-06-14 PROCEDURE — 99203 OFFICE O/P NEW LOW 30 MIN: CPT | Performed by: SURGERY

## 2022-06-14 PROCEDURE — G0463 HOSPITAL OUTPT CLINIC VISIT: HCPCS | Performed by: SURGERY

## 2022-06-14 RX ORDER — LEVOTHYROXINE SODIUM 0.03 MG/1
25 TABLET ORAL DAILY
COMMUNITY

## 2022-06-14 RX ORDER — NITROGLYCERIN 80 MG/1
1 PATCH TRANSDERMAL DAILY
COMMUNITY

## 2022-06-14 NOTE — PROGRESS NOTES
[facility]   HISTORY AND PHYSICAL    Patient Name: Georgi Simpson  : 1940  MRN: [id number]  Primary Care Physician:  Jose Alejandro Garcia MD      Subjective   Subjective     Chief Complaint: Right leg pain    HPI:    Georgi Simpson is a 82 y.o. male presents with right leg pain.  The pain starts in his knees and radiates towards the calf.  The pain also radiates up towards the right hip.  It is present when walking and when sitting or standing.  Lying down makes it better.  No claudication or rest pain.  No wounds on his feet.  He is a smoker.        Personal History     Past Medical History:   Diagnosis Date   • Abnormal CT of the chest    • Allergic rhinitis    • Anemia    • Anxiety    • Arteriosclerosis of coronary artery    • Cardiomyopathy (HCC)    • Chest pain, unspecified    • Community acquired pneumonia    • COPD (chronic obstructive pulmonary disease) (HCC)    • COPD exacerbation (HCC)    • Coronary angioplasty status    • Coronary artery disease    • Depression    • Gastritis    • GERD (gastroesophageal reflux disease)    • Hx of CABG    • Hypercholesteremia    • Hypotension    • Insomnia    • Lung nodule    • Near syncope    • Orthostatic hypotension    • Osteoarthritis    • Pulmonary hypertension (HCC)    • Respiratory distress    • Seizures (HCC)    • Spondylosis of lumbar spine    • Tobacco use        Past Surgical History:   Procedure Laterality Date   • CORONARY ANGIOPLASTY WITH STENT PLACEMENT     • CORONARY ARTERY BYPASS GRAFT     • LUMBAR SPINE SURGERY         Family History: family history is not on file. Otherwise pertinent FHx was reviewed and not pertinent to current issue.    Social History:  reports that he has been smoking cigarettes. He has been smoking about 0.25 packs per day. He has never used smokeless tobacco. He reports current alcohol use. He reports that he does not use drugs.    Home Medications:  Current Outpatient Medications on File Prior to Visit   Medication Sig   •  aspirin 81 MG chewable tablet Chew 81 mg Daily.   • atorvastatin (LIPITOR) 80 MG tablet Take 80 mg by mouth Daily.   • busPIRone (BUSPAR) 10 MG tablet Take 10 mg by mouth 3 (Three) Times a Day.   • cholecalciferol (VITAMIN D3) 1000 units tablet Take 1,000 Units by mouth Daily.   • clopidogrel (PLAVIX) 75 MG tablet Take 75 mg by mouth Daily.   • docusate sodium (COLACE) 100 MG capsule Take 100 mg by mouth 2 (Two) Times a Day.   • ferrous sulfate 325 (65 FE) MG tablet Take 1 tablet by mouth Every Other Day.   • gabapentin (NEURONTIN) 300 MG capsule Take 1 capsule by mouth 3 (Three) Times a Day.   • levETIRAcetam (KEPPRA) 500 MG tablet Take 500 mg by mouth 2 (Two) Times a Day.   • levothyroxine (SYNTHROID, LEVOTHROID) 25 MCG tablet Take 25 mcg by mouth Daily.   • Multiple Vitamins-Minerals (MULTIVITAMIN ADULT PO) Take  by mouth.   • nitroglycerin (NITRODUR) 0.4 MG/HR patch Place 1 patch on the skin as directed by provider Daily.   • pantoprazole (PROTONIX) 40 MG EC tablet Take 40 mg by mouth Daily.   • QUEtiapine (SEROquel) 50 MG tablet Take 50 mg by mouth Every Night.   • ranolazine (RANEXA) 500 MG 12 hr tablet Take 500 mg by mouth 2 (Two) Times a Day.   • sertraline (ZOLOFT) 100 MG tablet Take 100 mg by mouth Daily.   • umeclidinium-vilanterol (ANORO ELLIPTA) 62.5-25 MCG/INH aerosol powder  inhaler Inhale 1 puff Daily.   • ALPRAZolam (XANAX) 0.5 MG tablet Take 1 tablet by mouth 2 (Two) Times a Day As Needed for Anxiety.   • doxycycline (VIBRAMYCIN) 100 MG capsule Take 100 mg by mouth 2 (Two) Times a Day.   • ipratropium-albuterol (DUO-NEB) 0.5-2.5 mg/3 ml nebulizer Take 3 mL by nebulization Every 4 (Four) Hours As Needed for Wheezing.   • midodrine (PROAMATINE) 10 MG tablet Take 1 tablet by mouth 3 (Three) Times a Day.     No current facility-administered medications on file prior to visit.          Allergies:  Allergies   Allergen Reactions   • Buspar [Buspirone] Unknown (See Comments)     Unknown for patient   •  Levaquin [Levofloxacin] Unknown (See Comments)     Unknown by patient   • Zyrtec [Cetirizine] Unknown (See Comments)     Unknown per patient       Objective   Objective     Vitals:   Temp:  [98.2 °F (36.8 °C)] 98.2 °F (36.8 °C)  Heart Rate:  [71] 71  Resp:  [16] 16  BP: (120)/(74) 120/74    Physical Exam  Constitutional:       Appearance: Normal appearance.   HENT:      Head: Normocephalic and atraumatic.   Eyes:      Extraocular Movements: Extraocular movements intact.      Pupils: Pupils are equal, round, and reactive to light.   Cardiovascular:      Rate and Rhythm: Normal rate and regular rhythm.      Pulses:           Femoral pulses are 2+ on the right side and 2+ on the left side.       Popliteal pulses are 2+ on the right side and 2+ on the left side.        Dorsalis pedis pulses are 1+ on the right side and 2+ on the left side.        Posterior tibial pulses are 2+ on the right side and 1+ on the left side.   Pulmonary:      Effort: Pulmonary effort is normal.      Breath sounds: Normal breath sounds.   Abdominal:      General: Abdomen is flat. Bowel sounds are normal.      Palpations: Abdomen is soft.   Musculoskeletal:      Cervical back: Normal range of motion and neck supple.   Skin:     General: Skin is warm and dry.   Neurological:      General: No focal deficit present.      Mental Status: He is alert and oriented to person, place, and time.            Result Review    Most notable findings include:     Assessment & Plan   Assessment / Plan     Brief Patient Summary:  Georig Simpson is a 82 y.o. male who has no evidence of peripheral vascular disease of the lower extremities with leg pain    Diagnoses and all orders for this visit:    1. Pain of right lower extremity (Primary)         Plan:   Patient has palpable pedal pulses.  The arterial Dopplers done at an outside hospital are not reliable.  I see no evidence of peripheral vascular disease.  His leg pain is likely due to old age and arthritis.  No  further vascular intervention needed.  Follow-up as needed.    Thank you for allowing me to see your patient.        Electronically signed by Domi Mcmahon MD, MD, 06/14/22, 11:17 AM EDT.

## 2022-07-05 ENCOUNTER — HOSPITAL ENCOUNTER
Dept: HOSPITAL 49 - FER | Age: 82
LOS: 1 days | Discharge: HOME | End: 2022-07-06
Attending: INTERNAL MEDICINE | Admitting: INTERNAL MEDICINE
Payer: COMMERCIAL

## 2022-07-05 VITALS — BODY MASS INDEX: 22.83 KG/M2 | HEIGHT: 72.99 IN | WEIGHT: 172.25 LBS

## 2022-07-05 DIAGNOSIS — Z95.0: ICD-10-CM

## 2022-07-05 DIAGNOSIS — Z95.5: ICD-10-CM

## 2022-07-05 DIAGNOSIS — D64.9: ICD-10-CM

## 2022-07-05 DIAGNOSIS — R55: ICD-10-CM

## 2022-07-05 DIAGNOSIS — G40.909: ICD-10-CM

## 2022-07-05 DIAGNOSIS — Z79.899: ICD-10-CM

## 2022-07-05 DIAGNOSIS — I25.10: ICD-10-CM

## 2022-07-05 DIAGNOSIS — F43.10: ICD-10-CM

## 2022-07-05 DIAGNOSIS — F17.210: ICD-10-CM

## 2022-07-05 DIAGNOSIS — K21.9: ICD-10-CM

## 2022-07-05 DIAGNOSIS — J44.9: ICD-10-CM

## 2022-07-05 DIAGNOSIS — R53.1: ICD-10-CM

## 2022-07-05 DIAGNOSIS — Z79.82: ICD-10-CM

## 2022-07-05 DIAGNOSIS — Z95.1: ICD-10-CM

## 2022-07-05 DIAGNOSIS — Z79.01: ICD-10-CM

## 2022-07-05 DIAGNOSIS — I95.1: Primary | ICD-10-CM

## 2022-07-05 LAB
ALBUMIN SERPL-MCNC: 3.7 G/DL (ref 3.4–5)
ALKALINE PHOSHATASE: 42 U/L (ref 46–116)
ALT SERPL-CCNC: 24 U/L (ref 16–63)
AST: 16 U/L (ref 15–37)
BACTERIA: (no result)
BASOPHIL: 1 % (ref 0–2)
BILIRUBIN - TOTAL: 0.5 MG/DL (ref 0.2–1)
BILIRUBIN: (no result) MG/DL
BLOOD: NEGATIVE ERY/UL
BUN SERPL-MCNC: 26 MG/DL (ref 7–18)
BUN/CREAT RATIO (CALC): 25 RATIO
CHLORIDE: 109 MMOL/L (ref 98–107)
CLARITY UR: CLEAR
CO2 (BICARBONATE): 28 MMOL/L (ref 21–32)
COLOR: YELLOW
CREATININE: 1.04 MG/DL (ref 0.67–1.17)
EOSINOPHIL: 5 % (ref 0–7)
GLOBULIN (CALCULATION): 2.1 G/DL
GLUCOSE (U): NORMAL MG/DL
GLUCOSE SERPL-MCNC: 91 MG/DL (ref 74–106)
HCT: 25.9 % (ref 42–52)
HGB BLD-MCNC: 8.8 G/DL (ref 13.2–18)
LACTIC ACID: 1.2 MMOL/L (ref 0.4–1.9)
LEUKOCYTES: (no result) LEU/UL
LYMPHOCYTE: 35.9 % (ref 15–48)
MAGNESIUM SERPL-MCNC: 1.9 MG/DL (ref 1.8–2.4)
MCH RBC QN AUTO: 39.8 PG (ref 25–31)
MCHC RBC AUTO-ENTMCNC: 34 G/DL (ref 32–36)
MCV: 117.2 FL (ref 78–100)
MONOCYTE: 8.7 % (ref 0–12)
MPV: 10.9 FL (ref 6–9.5)
NEUTROPHIL: 48.8 % (ref 41–80)
NITRITE: NEGATIVE MG/DL
NRBC: 0
PLT: 312 K/UL (ref 150–400)
POTASSIUM: 3.7 MMOL/L (ref 3.5–5.1)
PROTEIN: (no result) MG/DL
RBC MORPHOLOGY: NORMAL
RBC: 2.21 M/UL (ref 4.7–6)
RDW: 18.4 % (ref 11.5–14)
SPECIFIC GRAVITY: >=1.03 (ref 1–1.03)
SQUAMOUS EPITHELIAL CELLS: (no result)
TOTAL PROTEIN: 5.8 G/DL (ref 6.4–8.2)
URINARY WBC: (no result)
UROBILINOGEN: 1 MG/DL (ref 0.2–1)
WBC: 5.2 K/UL (ref 4–10.5)

## 2022-07-05 PROCEDURE — G0378 HOSPITAL OBSERVATION PER HR: HCPCS

## 2022-07-05 NOTE — NUR
PATIENT ALERT AND ORIENTED ABLE TO MAKE NEEDS KNOWN, PATIENT HOOKED UP TO TELE
AND PULSE OX. BED IN LOW POSITION, CALL LIGHT INREACH.

## 2022-07-06 LAB
ALBUMIN SERPL-MCNC: 3.3 G/DL (ref 3.4–5)
ALKALINE PHOSHATASE: 42 U/L (ref 46–116)
ALT SERPL-CCNC: 17 U/L (ref 16–63)
AST: 10 U/L (ref 15–37)
BASOPHIL: 1 % (ref 0–2)
BILIRUBIN - TOTAL: 0.4 MG/DL (ref 0.2–1)
BUN SERPL-MCNC: 24 MG/DL (ref 7–18)
BUN/CREAT RATIO (CALC): 28.9 RATIO
CHLORIDE: 110 MMOL/L (ref 98–107)
CO2 (BICARBONATE): 27 MMOL/L (ref 21–32)
CREATININE: 0.83 MG/DL (ref 0.67–1.17)
EOSINOPHIL: 3.9 % (ref 0–7)
GLOBULIN (CALCULATION): 1.9 G/DL
GLUCOSE SERPL-MCNC: 87 MG/DL (ref 74–106)
HCT: 25.6 % (ref 42–52)
HGB BLD-MCNC: 8.7 G/DL (ref 13.2–18)
LYMPHOCYTE: 35.3 % (ref 15–48)
MCH RBC QN AUTO: 39.7 PG (ref 25–31)
MCHC RBC AUTO-ENTMCNC: 34 G/DL (ref 32–36)
MCV: 116.9 FL (ref 78–100)
MONOCYTE: 8.2 % (ref 0–12)
MPV: 10.7 FL (ref 6–9.5)
NEUTROPHIL: 51.2 % (ref 41–80)
NRBC: 0
PLT: 287 K/UL (ref 150–400)
POTASSIUM: 3.6 MMOL/L (ref 3.5–5.1)
RBC MORPHOLOGY: NORMAL
RBC: 2.19 M/UL (ref 4.7–6)
RDW: 18.2 % (ref 11.5–14)
TOTAL PROTEIN: 5.2 G/DL (ref 6.4–8.2)
WBC: 4.9 K/UL (ref 4–10.5)

## 2022-07-06 NOTE — NUR
7/6/22 Mr. Giles resides at [facility]. He has a cane and rw. An
outpatient PT appointment was scheduled for 7/12 at 10:15 at [facility]
PT.

## 2022-07-20 ENCOUNTER — HOSPITAL ENCOUNTER (EMERGENCY)
Dept: HOSPITAL 49 - FER | Age: 82
Discharge: HOME | End: 2022-07-20
Payer: COMMERCIAL

## 2022-07-20 DIAGNOSIS — J44.9: ICD-10-CM

## 2022-07-20 DIAGNOSIS — Z20.822: ICD-10-CM

## 2022-07-20 DIAGNOSIS — F17.210: ICD-10-CM

## 2022-07-20 DIAGNOSIS — I95.1: Primary | ICD-10-CM

## 2022-07-20 LAB
ALBUMIN SERPL-MCNC: 3.8 G/DL (ref 3.4–5)
ALKALINE PHOSHATASE: 44 U/L (ref 46–116)
ALT SERPL-CCNC: 25 U/L (ref 16–63)
AST: 20 U/L (ref 15–37)
BACTERIA: (no result)
BASOPHIL: 1.3 % (ref 0–2)
BILIRUBIN - TOTAL: 0.6 MG/DL (ref 0.2–1)
BILIRUBIN: NEGATIVE MG/DL
BLOOD: NEGATIVE ERY/UL
BUN SERPL-MCNC: 22 MG/DL (ref 7–18)
BUN/CREAT RATIO (CALC): 18.6 RATIO
CHLORIDE: 106 MMOL/L (ref 98–107)
CLARITY UR: CLEAR
CO2 (BICARBONATE): 29 MMOL/L (ref 21–32)
COLOR: YELLOW
CORONAVIRUS 2019 SARS-COV-2: NEGATIVE
CREATININE: 1.18 MG/DL (ref 0.67–1.17)
EOSINOPHIL: 4.5 % (ref 0–7)
GLOBULIN (CALCULATION): 2.3 G/DL
GLUCOSE (U): NORMAL MG/DL
GLUCOSE SERPL-MCNC: 91 MG/DL (ref 74–106)
HCT: 27.3 % (ref 42–52)
HGB BLD-MCNC: 9.3 G/DL (ref 13.2–18)
INFLUENZA A NAA: NEGATIVE
INR PPP: 1.1 (ref 0.9–1.2)
LEUKOCYTES: (no result) LEU/UL
LYMPHOCYTE: 33.3 % (ref 15–48)
MCH RBC QN AUTO: 39.6 PG (ref 25–31)
MCHC RBC AUTO-ENTMCNC: 34.1 G/DL (ref 32–36)
MCV: 116.2 FL (ref 78–100)
MONOCYTE: 7.7 % (ref 0–12)
MPV: 11 FL (ref 6–9.5)
NEUTROPHIL: 53 % (ref 41–80)
NITRITE: NEGATIVE MG/DL
NRBC: 0
PLT: 297 K/UL (ref 150–400)
POTASSIUM: 4.5 MMOL/L (ref 3.5–5.1)
PROTEIN: NEGATIVE MG/DL
PROTHROMBIN TIME: 13.9 SECONDS (ref 11.9–13.9)
PTT: 34.8 SECONDS (ref 24.9–34.6)
RBC MORPHOLOGY: NORMAL
RBC: 2.35 M/UL (ref 4.7–6)
RDW: 18.6 % (ref 11.5–14)
SPECIFIC GRAVITY: 1.01 (ref 1–1.03)
SQUAMOUS EPITHELIAL CELLS: (no result)
TOTAL PROTEIN: 6.1 G/DL (ref 6.4–8.2)
URINARY RBC: (no result)
URINARY WBC: (no result)
UROBILINOGEN: 2 MG/DL (ref 0.2–1)
WBC: 5.4 K/UL (ref 4–10.5)

## 2022-07-20 PROCEDURE — U0002 COVID-19 LAB TEST NON-CDC: HCPCS
